# Patient Record
Sex: FEMALE | Race: OTHER | HISPANIC OR LATINO
[De-identification: names, ages, dates, MRNs, and addresses within clinical notes are randomized per-mention and may not be internally consistent; named-entity substitution may affect disease eponyms.]

---

## 2020-02-26 ENCOUNTER — APPOINTMENT (OUTPATIENT)
Dept: GASTROENTEROLOGY | Facility: CLINIC | Age: 55
End: 2020-02-26

## 2020-03-25 ENCOUNTER — APPOINTMENT (OUTPATIENT)
Dept: GASTROENTEROLOGY | Facility: CLINIC | Age: 55
End: 2020-03-25

## 2020-05-27 ENCOUNTER — EMERGENCY (EMERGENCY)
Facility: HOSPITAL | Age: 55
LOS: 1 days | Discharge: HOME | End: 2020-05-27
Attending: STUDENT IN AN ORGANIZED HEALTH CARE EDUCATION/TRAINING PROGRAM
Payer: MEDICARE

## 2020-05-27 VITALS
HEIGHT: 61 IN | WEIGHT: 156.97 LBS | SYSTOLIC BLOOD PRESSURE: 104 MMHG | RESPIRATION RATE: 18 BRPM | OXYGEN SATURATION: 98 % | DIASTOLIC BLOOD PRESSURE: 68 MMHG | TEMPERATURE: 99 F | HEART RATE: 80 BPM

## 2020-05-27 PROCEDURE — 73610 X-RAY EXAM OF ANKLE: CPT | Mod: 26,RT

## 2020-05-27 PROCEDURE — 99284 EMERGENCY DEPT VISIT MOD MDM: CPT

## 2020-05-27 PROCEDURE — 73590 X-RAY EXAM OF LOWER LEG: CPT | Mod: 26,RT

## 2020-05-27 RX ADMIN — Medication 100 MILLIGRAM(S): at 23:55

## 2020-05-27 NOTE — ED PROVIDER NOTE - OBJECTIVE STATEMENT
56 yo female PMhx IDDM, HTN, CAD s/p stents presents to the ED c/o R ankle pain. 3 days ago pt was walking downstairs, missed last step and rolled ankle, unsure if eversion vs inversion, fell down onto anterior lower leg. Minimal pain immediately following incident, was ambulatory afterwards. Ambulated all day yesterday on foot, today noted increased swelling and pain to ankle prompting ED visit. Took tylenol ~5 hours ago w/ improvement. Denies head trauma, LOC, numbness/tingling, weakness, knee pain, cp, sob.

## 2020-05-27 NOTE — ED PROVIDER NOTE - NSFOLLOWUPINSTRUCTIONS_ED_ALL_ED_FT
We evaluated you in the emergency room and it appears that you have a muscle strain and cellulitis. We recommend for the cellulitis you take cephalexin for further management of the redness on the lower leg.     We recommend that you rest, ice and take tylenol(650 mg up to three times a day)/motrin(600 mg up to three times a day) for your symptoms.       If you still have persistent pain after 5-7 days after the injury please be re-evaluated as there could be a more severe diagnosis than just a strain. If you develop numbness, weakness, change in color of your extremities (turn blue or white), worsening pain please seek immediate medical care for repeat evaluation.

## 2020-05-27 NOTE — ED PROVIDER NOTE - LOWER EXTREMITY EXAM, RIGHT
+swelling and ecchymosis noted to R ankle, most notably to lateral malleolar region but also +mild swelling to medial malleolus. +ttp over lateral and medial malleoli, lateral>medial, +ATFL ttp. No patellar to lateral/medial knee joint line ttp. No bony forefoot ttp. +proximal fib ttp. Negative Lachman's, negative Jc's. No increased joint laxity of ankle. No calf ttp. Full AROM knee and hip with 5/5 strength. ROM R ankle limited all directions 2/2 swelling and pain. Compartments soft/compressible. Sensation intact. +swelling and ecchymosis noted to R ankle, most notably to lateral malleolar region but also +mild swelling to medial malleolus. +ttp over lateral and medial malleoli, lateral>medial, +ATFL ttp. No patellar or lateral/medial knee joint line ttp. No knee joint effusion. No bony forefoot ttp. +proximal fib ttp. Negative Lachman's, negative Jc's. No increased joint laxity of ankle. No calf ttp. Full AROM knee and hip with 5/5 strength. ROM R ankle limited all directions 2/2 swelling and pain. Compartments soft/compressible. Sensation intact. +swelling and ecchymosis noted to R ankle, most notably to lateral malleolar region but also +mild swelling to medial malleolus. +ttp over lateral and medial malleoli, lateral>medial, +ATFL ttp. No patellar or lateral/medial knee joint line ttp. No knee joint effusion. No bony forefoot ttp. +proximal fib ttp, no proximal tibial ttp. Negative Lachman's, negative Jc's. No increased joint laxity of ankle. No calf ttp. Full AROM knee and hip with 5/5 strength. ROM R ankle limited all directions 2/2 swelling and pain. Compartments soft/compressible. Sensation intact.

## 2020-05-27 NOTE — ED PROVIDER NOTE - PROGRESS NOTE DETAILS
Shanae Hendrickson MD phone call w/ daughter marta, injury 3 days ago(MONDAY) had pain and then symptoms have been improving, yesterday (tuesday) pt was very active, today pt w/ decreased activity 2/2 pain, last tylenol was at 630 PM, no fevers at home, no vomiting, pt w/  normal WBC, feels comfortable taking the patient home w/ oral antibiotics will tx w/ cellulitis and ankle sprain Shanae Hendrickson MD pt able to range the ankle w/ no worsening pain. s/p ace wrap, ambulatory in the ed .

## 2020-05-27 NOTE — ED PROVIDER NOTE - PATIENT PORTAL LINK FT
You can access the FollowMyHealth Patient Portal offered by [facility] by registering at the following website: [email or web address]. By joining [employer]’s FollowMyHealth portal, you will also be able to view your health information using other applications (apps) compatible with our system.

## 2020-05-27 NOTE — ED ADULT NURSE NOTE - NSIMPLEMENTINTERV_GEN_ALL_ED
Implemented All Fall Risk Interventions:  [place] to call system. Call bell, personal items and telephone within reach. Instruct patient to call for assistance. Room bathroom lighting operational. Non-slip footwear when patient is off stretcher. Physically safe environment: no spills, clutter or unnecessary equipment. Stretcher in lowest position, wheels locked, appropriate side rails in place. Provide visual cue, wrist band, yellow gown, etc. Monitor gait and stability. Monitor for mental status changes and reorient to person, place, and time. Review medications for side effects contributing to fall risk. Reinforce activity limits and safety measures with patient and family.

## 2020-05-27 NOTE — ED ADULT TRIAGE NOTE - CHIEF COMPLAINT QUOTE
Patient c/o right ankle swelling and pain s/p mechanical fall on Monday. Patient fell two steps down from basement stairs hurting right leg. No LOC, No head injury. History of Diabetes and CAD and HTN. Also anxiety and Depression.

## 2020-05-27 NOTE — ED PROVIDER NOTE - NSFOLLOWUPCLINICS_GEN_ALL_ED_FT
[facility] [place]  [facility]  .  [place]   Phone: [phone]  Fax:   Follow Up Time: Routine

## 2020-05-27 NOTE — ED ADULT NURSE NOTE - OBJECTIVE STATEMENT
55 YOF A&OX3 with pmh of 3 stents in heart, depression & DM presents to ED for right ankle pain s/p fall. Pt states fell 2 days ago walking downstairs to basement and hurt ankle. Pt rates pain 4/10, swelling noted to right ankle. pt denies sob, chest pain, n/v/d, headaches, dizziness, blurry vision.

## 2020-05-27 NOTE — ED PROVIDER NOTE - ATTENDING CONTRIBUTION TO CARE
anthony [id number] anthony [id number]  R ankle pain w/ erythema and pain, s/p ankle injury 3 days prior  now w/ worsening pain,   pt reports increased pain and swelling to the joint  able to flex and extend the ankle but w pain,   plan for labs, iv antibiotics anthony [id number]  R ankle pain w/ erythema and pain, s/p ankle injury 3 days prior  now w/ worsening pain,   pt reports increased pain and swelling to the joint  able to flex and extend the R ankle but w pain,  mild erythema on the lower R lower extremity no swelling of the calf, no tenderness. anthony [id number]  used.   Pt reports R ankle pain s/p injury 3 days prior w/ rolling ankle inwards now has worsening pain pt w/ mild swelling to the joint, no overlying erythema; mild erythema on the posterior aspect of the lower extremity and medial aspect approx 3cm by 5 cm proximal to the ankle joint. no swelling of the calf, no tenderness.  concern for cellulitis pt able to range the ankle but limited 2/2 pain, pt is able to bear weight on the lower extremity reports that she was ambulatory yesterday w/ significant activity. xray negative for fx, will dc on antibiotics, outpt follow up daughter and pt report understanding will return if fevers, vomiting, worsening pain in the ankle or swelling. Pt d/c'd w/ ace wrap

## 2020-05-27 NOTE — ED ADULT NURSE NOTE - CHIEF COMPLAINT QUOTE
Patient c/o right ankle swelling and painful s/p mechanical fall on Monday., 2 steps down from baseman stairs, hurting her leg. No head injury, no LOC. History of Diabetes, depression, and 3 stents to the heart (last stent on April 2019)

## 2020-05-27 NOTE — ED PROVIDER NOTE - CARE PLAN
Principal Discharge DX:	Sprain of other ligament of right ankle, initial encounter  Secondary Diagnosis:	Cellulitis, unspecified cellulitis site

## 2020-05-28 VITALS
TEMPERATURE: 98 F | DIASTOLIC BLOOD PRESSURE: 64 MMHG | RESPIRATION RATE: 16 BRPM | SYSTOLIC BLOOD PRESSURE: 98 MMHG | OXYGEN SATURATION: 99 % | HEART RATE: 66 BPM

## 2020-05-28 LAB
ALBUMIN SERPL ELPH-MCNC: 4.2 G/DL — SIGNIFICANT CHANGE UP (ref 3.3–5)
ALP SERPL-CCNC: 80 U/L — SIGNIFICANT CHANGE UP (ref 40–120)
ALT FLD-CCNC: 29 U/L — SIGNIFICANT CHANGE UP (ref 10–45)
ANION GAP SERPL CALC-SCNC: 10 MMOL/L — SIGNIFICANT CHANGE UP (ref 5–17)
AST SERPL-CCNC: 18 U/L — SIGNIFICANT CHANGE UP (ref 10–40)
BASOPHILS # BLD AUTO: 0.03 K/UL — SIGNIFICANT CHANGE UP (ref 0–0.2)
BASOPHILS NFR BLD AUTO: 0.5 % — SIGNIFICANT CHANGE UP (ref 0–2)
BILIRUB SERPL-MCNC: 0.2 MG/DL — SIGNIFICANT CHANGE UP (ref 0.2–1.2)
BUN SERPL-MCNC: 16 MG/DL — SIGNIFICANT CHANGE UP (ref 7–23)
CALCIUM SERPL-MCNC: 9.5 MG/DL — SIGNIFICANT CHANGE UP (ref 8.4–10.5)
CHLORIDE SERPL-SCNC: 102 MMOL/L — SIGNIFICANT CHANGE UP (ref 96–108)
CO2 SERPL-SCNC: 24 MMOL/L — SIGNIFICANT CHANGE UP (ref 22–31)
CREAT SERPL-MCNC: 1.01 MG/DL — SIGNIFICANT CHANGE UP (ref 0.5–1.3)
CRP SERPL-MCNC: 1.25 UG/ML — SIGNIFICANT CHANGE UP (ref 0–40)
EOSINOPHIL # BLD AUTO: 0.1 K/UL — SIGNIFICANT CHANGE UP (ref 0–0.5)
EOSINOPHIL NFR BLD AUTO: 1.7 % — SIGNIFICANT CHANGE UP (ref 0–6)
ERYTHROCYTE [SEDIMENTATION RATE] IN BLOOD: 35 MM/HR — HIGH (ref 0–20)
GAS PNL BLDV: SIGNIFICANT CHANGE UP
GLUCOSE SERPL-MCNC: 336 MG/DL — HIGH (ref 70–99)
HCT VFR BLD CALC: 32.4 % — LOW (ref 34.5–45)
HGB BLD-MCNC: 10.5 G/DL — LOW (ref 11.5–15.5)
IMM GRANULOCYTES NFR BLD AUTO: 0.3 % — SIGNIFICANT CHANGE UP (ref 0–1.5)
LYMPHOCYTES # BLD AUTO: 2.95 K/UL — SIGNIFICANT CHANGE UP (ref 1–3.3)
LYMPHOCYTES # BLD AUTO: 50.7 % — HIGH (ref 13–44)
MCHC RBC-ENTMCNC: 29.3 PG — SIGNIFICANT CHANGE UP (ref 27–34)
MCHC RBC-ENTMCNC: 32.4 GM/DL — SIGNIFICANT CHANGE UP (ref 32–36)
MCV RBC AUTO: 90.5 FL — SIGNIFICANT CHANGE UP (ref 80–100)
MONOCYTES # BLD AUTO: 0.46 K/UL — SIGNIFICANT CHANGE UP (ref 0–0.9)
MONOCYTES NFR BLD AUTO: 7.9 % — SIGNIFICANT CHANGE UP (ref 2–14)
NEUTROPHILS # BLD AUTO: 2.26 K/UL — SIGNIFICANT CHANGE UP (ref 1.8–7.4)
NEUTROPHILS NFR BLD AUTO: 38.9 % — LOW (ref 43–77)
NRBC # BLD: 0 /100 WBCS — SIGNIFICANT CHANGE UP (ref 0–0)
PLATELET # BLD AUTO: 236 K/UL — SIGNIFICANT CHANGE UP (ref 150–400)
POTASSIUM SERPL-MCNC: 4.2 MMOL/L — SIGNIFICANT CHANGE UP (ref 3.5–5.3)
POTASSIUM SERPL-SCNC: 4.2 MMOL/L — SIGNIFICANT CHANGE UP (ref 3.5–5.3)
PROT SERPL-MCNC: 6.8 G/DL — SIGNIFICANT CHANGE UP (ref 6–8.3)
RBC # BLD: 3.58 M/UL — LOW (ref 3.8–5.2)
RBC # FLD: 12.7 % — SIGNIFICANT CHANGE UP (ref 10.3–14.5)
SODIUM SERPL-SCNC: 136 MMOL/L — SIGNIFICANT CHANGE UP (ref 135–145)
WBC # BLD: 5.82 K/UL — SIGNIFICANT CHANGE UP (ref 3.8–10.5)
WBC # FLD AUTO: 5.82 K/UL — SIGNIFICANT CHANGE UP (ref 3.8–10.5)

## 2020-05-28 PROCEDURE — 99284 EMERGENCY DEPT VISIT MOD MDM: CPT | Mod: 25

## 2020-05-28 PROCEDURE — 73610 X-RAY EXAM OF ANKLE: CPT

## 2020-05-28 PROCEDURE — 83605 ASSAY OF LACTIC ACID: CPT

## 2020-05-28 PROCEDURE — 82330 ASSAY OF CALCIUM: CPT

## 2020-05-28 PROCEDURE — 82947 ASSAY GLUCOSE BLOOD QUANT: CPT

## 2020-05-28 PROCEDURE — 85652 RBC SED RATE AUTOMATED: CPT

## 2020-05-28 PROCEDURE — 96374 THER/PROPH/DIAG INJ IV PUSH: CPT

## 2020-05-28 PROCEDURE — 85027 COMPLETE CBC AUTOMATED: CPT

## 2020-05-28 PROCEDURE — 86140 C-REACTIVE PROTEIN: CPT

## 2020-05-28 PROCEDURE — 73590 X-RAY EXAM OF LOWER LEG: CPT

## 2020-05-28 PROCEDURE — 84295 ASSAY OF SERUM SODIUM: CPT

## 2020-05-28 PROCEDURE — 82962 GLUCOSE BLOOD TEST: CPT

## 2020-05-28 PROCEDURE — 82803 BLOOD GASES ANY COMBINATION: CPT

## 2020-05-28 PROCEDURE — 80053 COMPREHEN METABOLIC PANEL: CPT

## 2020-05-28 PROCEDURE — 82435 ASSAY OF BLOOD CHLORIDE: CPT

## 2020-05-28 PROCEDURE — 85014 HEMATOCRIT: CPT

## 2020-05-28 PROCEDURE — 84132 ASSAY OF SERUM POTASSIUM: CPT

## 2020-05-28 RX ORDER — CEPHALEXIN 500 MG
1 CAPSULE ORAL
Qty: 28 | Refills: 0
Start: 2020-05-28 | End: 2020-06-03

## 2020-05-28 RX ORDER — IBUPROFEN 200 MG
1 TABLET ORAL
Qty: 21 | Refills: 0
Start: 2020-05-28 | End: 2020-06-03

## 2020-05-28 RX ORDER — ACETAMINOPHEN 500 MG
650 TABLET ORAL ONCE
Refills: 0 | Status: COMPLETED | OUTPATIENT
Start: 2020-05-28 | End: 2020-05-28

## 2020-05-28 RX ADMIN — Medication 650 MILLIGRAM(S): at 00:38

## 2020-05-29 NOTE — ED POST DISCHARGE NOTE - REASON FOR FOLLOW-UP
Radiology Follow-up/Other Xray ankle R: Irregularity of the medial talar dome with small overlying ossific fragment, consistent with osteochondral lesion. Possible small joint bodies. Small plantar calcaneal enthesophyte.

## 2020-05-29 NOTE — ED POST DISCHARGE NOTE - DETAILS
5/29: Unable to reach patient, LVM to call back admin line. -Ifeoma Castellanos PA-C Informed patient of findings on xray. patient is able to ambulate without difficulty with intermittent ace wrap. advised her to continue to wear as needed and follow-up with orthopedics clinic with information provided to her. patient verbalized understanding and agreement with plan. -Selma West PA-C

## 2020-05-29 NOTE — ED POST DISCHARGE NOTE - OTHER COMMUNICATION
6/5:  CRP 1.25. ESR elevated as well noted above. No need to contact pt as would not  - Estela Swain PA-C

## 2020-06-10 ENCOUNTER — APPOINTMENT (OUTPATIENT)
Dept: GASTROENTEROLOGY | Facility: CLINIC | Age: 55
End: 2020-06-10

## 2022-07-26 ENCOUNTER — NON-APPOINTMENT (OUTPATIENT)
Age: 57
End: 2022-07-26

## 2022-09-15 NOTE — ED PROVIDER NOTE - SKIN [+], MLM
Patient presents with following prescriptions from dental work on 9/14/22:    Amoxicillin 500 mg, take one by mouth every 8 hours   #21    Oxycodone/acetaminophen 5-325mg, take one tablet every 4-6 hours as needed, do not exceed more than 4 tablets in a 24 period. #10.    Patient aware that medications need to be on him at all times and are not be left in the group room. Patient encouraged to only bring medication that he may need to take during group hours and to leave additional medication at home.    Tasha Ross RN, BSN     BRUISING/R ankle

## 2023-01-24 ENCOUNTER — NON-APPOINTMENT (OUTPATIENT)
Age: 58
End: 2023-01-24

## 2023-01-25 ENCOUNTER — APPOINTMENT (OUTPATIENT)
Dept: PULMONOLOGY | Facility: CLINIC | Age: 58
End: 2023-01-25
Payer: MEDICARE

## 2023-01-25 VITALS
HEIGHT: 61 IN | RESPIRATION RATE: 16 BRPM | BODY MASS INDEX: 27.94 KG/M2 | WEIGHT: 148 LBS | DIASTOLIC BLOOD PRESSURE: 67 MMHG | HEART RATE: 77 BPM | SYSTOLIC BLOOD PRESSURE: 103 MMHG | OXYGEN SATURATION: 99 %

## 2023-01-25 DIAGNOSIS — Z98.890 OTHER SPECIFIED POSTPROCEDURAL STATES: ICD-10-CM

## 2023-01-25 DIAGNOSIS — R06.09 OTHER FORMS OF DYSPNEA: ICD-10-CM

## 2023-01-25 DIAGNOSIS — Z87.898 PERSONAL HISTORY OF OTHER SPECIFIED CONDITIONS: ICD-10-CM

## 2023-01-25 DIAGNOSIS — R06.00 DYSPNEA, UNSPECIFIED: ICD-10-CM

## 2023-01-25 DIAGNOSIS — Z87.09 PERSONAL HISTORY OF OTHER DISEASES OF THE RESPIRATORY SYSTEM: ICD-10-CM

## 2023-01-25 PROCEDURE — 71046 X-RAY EXAM CHEST 2 VIEWS: CPT

## 2023-01-25 PROCEDURE — 99205 OFFICE O/P NEW HI 60 MIN: CPT | Mod: 25

## 2023-01-25 PROCEDURE — 95012 NITRIC OXIDE EXP GAS DETER: CPT

## 2023-01-25 PROCEDURE — 94010 BREATHING CAPACITY TEST: CPT

## 2023-01-25 RX ORDER — MONTELUKAST SODIUM 10 MG/1
TABLET, FILM COATED ORAL
Refills: 0 | Status: ACTIVE | COMMUNITY

## 2023-01-25 RX ORDER — IPRATROPIUM BROMIDE AND ALBUTEROL SULFATE .5; 3 MG/3ML; MG/3ML
SOLUTION RESPIRATORY (INHALATION)
Refills: 0 | Status: ACTIVE | COMMUNITY

## 2023-01-25 RX ORDER — ASPIRIN 81 MG
81 TABLET, DELAYED RELEASE (ENTERIC COATED) ORAL
Refills: 0 | Status: ACTIVE | COMMUNITY

## 2023-01-25 RX ORDER — ARFORMOTEROL TARTRATE 15 UG/2ML
SOLUTION RESPIRATORY (INHALATION)
Refills: 0 | Status: ACTIVE | COMMUNITY

## 2023-01-25 RX ORDER — BUDESONIDE 0.5 MG/2ML
0.5 INHALANT ORAL
Refills: 0 | Status: ACTIVE | COMMUNITY

## 2023-01-25 RX ORDER — BUPROPION HYDROCHLORIDE 75 MG/1
TABLET, FILM COATED ORAL
Refills: 0 | Status: ACTIVE | COMMUNITY

## 2023-01-25 RX ORDER — METOPROLOL TARTRATE 75 MG/1
TABLET, FILM COATED ORAL
Refills: 0 | Status: ACTIVE | COMMUNITY

## 2023-01-25 RX ORDER — OXYCODONE AND ACETAMINOPHEN TABLETS 10; 300 MG/1; MG/1
TABLET ORAL
Refills: 0 | Status: ACTIVE | COMMUNITY

## 2023-01-25 RX ORDER — INSULIN GLARGINE 100 [IU]/ML
INJECTION, SOLUTION SUBCUTANEOUS
Refills: 0 | Status: ACTIVE | COMMUNITY

## 2023-01-25 RX ORDER — SERTRALINE HYDROCHLORIDE 150 MG/1
CAPSULE ORAL
Refills: 0 | Status: ACTIVE | COMMUNITY

## 2023-01-25 RX ORDER — FAMOTIDINE 40 MG/1
TABLET, FILM COATED ORAL
Refills: 0 | Status: ACTIVE | COMMUNITY

## 2023-01-25 RX ORDER — ATORVASTATIN CALCIUM 80 MG/1
TABLET, FILM COATED ORAL
Refills: 0 | Status: ACTIVE | COMMUNITY

## 2023-01-26 ENCOUNTER — NON-APPOINTMENT (OUTPATIENT)
Age: 58
End: 2023-01-26

## 2023-01-26 LAB
ANION GAP SERPL CALC-SCNC: 17 MMOL/L
BASOPHILS # BLD AUTO: 0.05 K/UL
BASOPHILS NFR BLD AUTO: 0.8 %
BUN SERPL-MCNC: 13 MG/DL
CALCIUM SERPL-MCNC: 10.3 MG/DL
CHLORIDE SERPL-SCNC: 100 MMOL/L
CO2 SERPL-SCNC: 23 MMOL/L
CREAT SERPL-MCNC: 0.62 MG/DL
DEPRECATED D DIMER PPP IA-ACNC: 944 NG/ML DDU
EGFR: 104 ML/MIN/1.73M2
EOSINOPHIL # BLD AUTO: 0.08 K/UL
EOSINOPHIL NFR BLD AUTO: 1.3 %
GLUCOSE SERPL-MCNC: 133 MG/DL
HCT VFR BLD CALC: 33.9 %
HGB BLD-MCNC: 10.8 G/DL
IMM GRANULOCYTES NFR BLD AUTO: 0.3 %
LYMPHOCYTES # BLD AUTO: 2.89 K/UL
LYMPHOCYTES NFR BLD AUTO: 46.2 %
MAN DIFF?: NORMAL
MCHC RBC-ENTMCNC: 28.8 PG
MCHC RBC-ENTMCNC: 31.9 GM/DL
MCV RBC AUTO: 90.4 FL
MONOCYTES # BLD AUTO: 0.5 K/UL
MONOCYTES NFR BLD AUTO: 8 %
NEUTROPHILS # BLD AUTO: 2.71 K/UL
NEUTROPHILS NFR BLD AUTO: 43.4 %
NT-PROBNP SERPL-MCNC: 553 PG/ML
PLATELET # BLD AUTO: 493 K/UL
POTASSIUM SERPL-SCNC: 5.4 MMOL/L
RBC # BLD: 3.75 M/UL
RBC # FLD: 13.8 %
SODIUM SERPL-SCNC: 141 MMOL/L
WBC # FLD AUTO: 6.25 K/UL

## 2023-01-29 PROBLEM — R06.00 DYSPNEA ON EFFORT: Status: ACTIVE | Noted: 2023-01-25

## 2023-01-29 NOTE — PHYSICAL EXAM
[No Acute Distress] : no acute distress [Well Nourished] : well nourished [Well Developed] : well developed [No Resp Distress] : no resp distress [No Acc Muscle Use] : no acc muscle use [Clear to Auscultation Bilaterally] : clear to auscultation bilaterally [Oriented x3] : oriented x3 [TextBox_54] : healing surgical cabg scar, no drainage, no pus, no erythema

## 2023-01-29 NOTE — HISTORY OF PRESENT ILLNESS
[< 20 pack-years] : < 20 pack-years [Never] : never [TextBox_4] : NATASHA AGUILERA is a 58 year old female who presents with family for pulm eval\par \par PMH: cad s/p cardiac cath, s/p cabg in 2023,  asthma history, on albuterol TID, ex smoker- 20 years X 0.25\par had CABG at [facility] 1/6/2023\par no post op compliations\par \par pending first surgical follow up this afternoon\par \par has a cough- \par hard to breath in - especially with deep  inspiration\par \par asthma history- albuterol PRN [TextBox_11] : 0.25 [TextBox_13] : 20 [YearQuit] : 2023

## 2023-01-29 NOTE — PROCEDURE
[FreeTextEntry1] : hard to do ángel\par niox 17\par \par PA and lateral chest xray performed using standard projections. Bones and soft tissues structures are unremarkable.Cardiac silhouette show surgical sutures . Lung fields are clear. No infiltrate or masses noted. Mediastinal contours are normal.\par IMPRESSION: s.p CABG\par \par

## 2023-10-02 ENCOUNTER — NON-APPOINTMENT (OUTPATIENT)
Age: 58
End: 2023-10-02

## 2023-10-03 ENCOUNTER — EMERGENCY (EMERGENCY)
Facility: HOSPITAL | Age: 58
LOS: 1 days | Discharge: HOME | End: 2023-10-03
Attending: EMERGENCY MEDICINE
Payer: COMMERCIAL

## 2023-10-03 VITALS
RESPIRATION RATE: 19 BRPM | HEART RATE: 57 BPM | DIASTOLIC BLOOD PRESSURE: 78 MMHG | TEMPERATURE: 98 F | OXYGEN SATURATION: 98 % | WEIGHT: 145.06 LBS | HEIGHT: 60 IN | SYSTOLIC BLOOD PRESSURE: 116 MMHG

## 2023-10-03 VITALS
SYSTOLIC BLOOD PRESSURE: 121 MMHG | HEART RATE: 59 BPM | RESPIRATION RATE: 18 BRPM | OXYGEN SATURATION: 98 % | DIASTOLIC BLOOD PRESSURE: 83 MMHG | TEMPERATURE: 98 F

## 2023-10-03 LAB
ALBUMIN SERPL ELPH-MCNC: 4.6 G/DL — SIGNIFICANT CHANGE UP (ref 3.3–5)
ALP SERPL-CCNC: 75 U/L — SIGNIFICANT CHANGE UP (ref 40–120)
ALT FLD-CCNC: 37 U/L — SIGNIFICANT CHANGE UP (ref 10–45)
ANION GAP SERPL CALC-SCNC: 11 MMOL/L — SIGNIFICANT CHANGE UP (ref 5–17)
APPEARANCE UR: CLEAR — SIGNIFICANT CHANGE UP
AST SERPL-CCNC: 25 U/L — SIGNIFICANT CHANGE UP (ref 10–40)
BACTERIA # UR AUTO: NEGATIVE — SIGNIFICANT CHANGE UP
BASE EXCESS BLDV CALC-SCNC: 2.9 MMOL/L — SIGNIFICANT CHANGE UP (ref -2–3)
BASOPHILS # BLD AUTO: 0.05 K/UL — SIGNIFICANT CHANGE UP (ref 0–0.2)
BASOPHILS NFR BLD AUTO: 0.5 % — SIGNIFICANT CHANGE UP (ref 0–2)
BILIRUB SERPL-MCNC: 0.2 MG/DL — SIGNIFICANT CHANGE UP (ref 0.2–1.2)
BILIRUB UR-MCNC: NEGATIVE — SIGNIFICANT CHANGE UP
BUN SERPL-MCNC: 25 MG/DL — HIGH (ref 7–23)
CA-I SERPL-SCNC: 1.31 MMOL/L — SIGNIFICANT CHANGE UP (ref 1.15–1.33)
CALCIUM SERPL-MCNC: 10 MG/DL — SIGNIFICANT CHANGE UP (ref 8.4–10.5)
CHLORIDE BLDV-SCNC: 102 MMOL/L — SIGNIFICANT CHANGE UP (ref 96–108)
CHLORIDE SERPL-SCNC: 100 MMOL/L — SIGNIFICANT CHANGE UP (ref 96–108)
CO2 BLDV-SCNC: 32 MMOL/L — HIGH (ref 22–26)
CO2 SERPL-SCNC: 27 MMOL/L — SIGNIFICANT CHANGE UP (ref 22–31)
COLOR SPEC: COLORLESS — SIGNIFICANT CHANGE UP
CREAT SERPL-MCNC: 0.72 MG/DL — SIGNIFICANT CHANGE UP (ref 0.5–1.3)
CRP SERPL-MCNC: 4 MG/L — SIGNIFICANT CHANGE UP (ref 0–4)
DIFF PNL FLD: NEGATIVE — SIGNIFICANT CHANGE UP
EGFR: 98 ML/MIN/1.73M2 — SIGNIFICANT CHANGE UP
EOSINOPHIL # BLD AUTO: 0.35 K/UL — SIGNIFICANT CHANGE UP (ref 0–0.5)
EOSINOPHIL NFR BLD AUTO: 3.6 % — SIGNIFICANT CHANGE UP (ref 0–6)
EPI CELLS # UR: 2 /HPF — SIGNIFICANT CHANGE UP
ERYTHROCYTE [SEDIMENTATION RATE] IN BLOOD: 25 MM/HR — HIGH (ref 0–20)
GAS PNL BLDV: 134 MMOL/L — LOW (ref 136–145)
GAS PNL BLDV: SIGNIFICANT CHANGE UP
GAS PNL BLDV: SIGNIFICANT CHANGE UP
GLUCOSE BLDV-MCNC: 207 MG/DL — HIGH (ref 70–99)
GLUCOSE SERPL-MCNC: 206 MG/DL — HIGH (ref 70–99)
GLUCOSE UR QL: ABNORMAL
HCO3 BLDV-SCNC: 30 MMOL/L — HIGH (ref 22–29)
HCT VFR BLD CALC: 35.6 % — SIGNIFICANT CHANGE UP (ref 34.5–45)
HCT VFR BLDA CALC: 38 % — SIGNIFICANT CHANGE UP (ref 34.5–46.5)
HGB BLD CALC-MCNC: 12.6 G/DL — SIGNIFICANT CHANGE UP (ref 11.7–16.1)
HGB BLD-MCNC: 12.1 G/DL — SIGNIFICANT CHANGE UP (ref 11.5–15.5)
HYALINE CASTS # UR AUTO: 0 /LPF — SIGNIFICANT CHANGE UP (ref 0–2)
IMM GRANULOCYTES NFR BLD AUTO: 0.5 % — SIGNIFICANT CHANGE UP (ref 0–0.9)
KETONES UR-MCNC: NEGATIVE — SIGNIFICANT CHANGE UP
LACTATE BLDV-MCNC: 1 MMOL/L — SIGNIFICANT CHANGE UP (ref 0.5–2)
LEUKOCYTE ESTERASE UR-ACNC: ABNORMAL
LYMPHOCYTES # BLD AUTO: 2.68 K/UL — SIGNIFICANT CHANGE UP (ref 1–3.3)
LYMPHOCYTES # BLD AUTO: 27.9 % — SIGNIFICANT CHANGE UP (ref 13–44)
MCHC RBC-ENTMCNC: 30.5 PG — SIGNIFICANT CHANGE UP (ref 27–34)
MCHC RBC-ENTMCNC: 34 GM/DL — SIGNIFICANT CHANGE UP (ref 32–36)
MCV RBC AUTO: 89.7 FL — SIGNIFICANT CHANGE UP (ref 80–100)
MONOCYTES # BLD AUTO: 0.63 K/UL — SIGNIFICANT CHANGE UP (ref 0–0.9)
MONOCYTES NFR BLD AUTO: 6.6 % — SIGNIFICANT CHANGE UP (ref 2–14)
NEUTROPHILS # BLD AUTO: 5.85 K/UL — SIGNIFICANT CHANGE UP (ref 1.8–7.4)
NEUTROPHILS NFR BLD AUTO: 60.9 % — SIGNIFICANT CHANGE UP (ref 43–77)
NITRITE UR-MCNC: NEGATIVE — SIGNIFICANT CHANGE UP
NRBC # BLD: 0 /100 WBCS — SIGNIFICANT CHANGE UP (ref 0–0)
PCO2 BLDV: 59 MMHG — HIGH (ref 39–42)
PH BLDV: 7.32 — SIGNIFICANT CHANGE UP (ref 7.32–7.43)
PH UR: 6 — SIGNIFICANT CHANGE UP (ref 5–8)
PLATELET # BLD AUTO: 232 K/UL — SIGNIFICANT CHANGE UP (ref 150–400)
PO2 BLDV: 23 MMHG — LOW (ref 25–45)
POTASSIUM BLDV-SCNC: 4.1 MMOL/L — SIGNIFICANT CHANGE UP (ref 3.5–5.1)
POTASSIUM SERPL-MCNC: 4.1 MMOL/L — SIGNIFICANT CHANGE UP (ref 3.5–5.3)
POTASSIUM SERPL-SCNC: 4.1 MMOL/L — SIGNIFICANT CHANGE UP (ref 3.5–5.3)
PROT SERPL-MCNC: 7.4 G/DL — SIGNIFICANT CHANGE UP (ref 6–8.3)
PROT UR-MCNC: NEGATIVE — SIGNIFICANT CHANGE UP
RBC # BLD: 3.97 M/UL — SIGNIFICANT CHANGE UP (ref 3.8–5.2)
RBC # FLD: 12.4 % — SIGNIFICANT CHANGE UP (ref 10.3–14.5)
RBC CASTS # UR COMP ASSIST: 0 /HPF — SIGNIFICANT CHANGE UP (ref 0–4)
SAO2 % BLDV: 27.4 % — LOW (ref 67–88)
SODIUM SERPL-SCNC: 138 MMOL/L — SIGNIFICANT CHANGE UP (ref 135–145)
SP GR SPEC: 1.01 — SIGNIFICANT CHANGE UP (ref 1.01–1.02)
UROBILINOGEN FLD QL: NEGATIVE — SIGNIFICANT CHANGE UP
WBC # BLD: 9.61 K/UL — SIGNIFICANT CHANGE UP (ref 3.8–10.5)
WBC # FLD AUTO: 9.61 K/UL — SIGNIFICANT CHANGE UP (ref 3.8–10.5)
WBC UR QL: 4 /HPF — SIGNIFICANT CHANGE UP (ref 0–5)

## 2023-10-03 PROCEDURE — 85018 HEMOGLOBIN: CPT

## 2023-10-03 PROCEDURE — 81001 URINALYSIS AUTO W/SCOPE: CPT

## 2023-10-03 PROCEDURE — 86140 C-REACTIVE PROTEIN: CPT

## 2023-10-03 PROCEDURE — 73630 X-RAY EXAM OF FOOT: CPT

## 2023-10-03 PROCEDURE — 36415 COLL VENOUS BLD VENIPUNCTURE: CPT

## 2023-10-03 PROCEDURE — 85014 HEMATOCRIT: CPT

## 2023-10-03 PROCEDURE — 84484 ASSAY OF TROPONIN QUANT: CPT

## 2023-10-03 PROCEDURE — 96375 TX/PRO/DX INJ NEW DRUG ADDON: CPT

## 2023-10-03 PROCEDURE — 84132 ASSAY OF SERUM POTASSIUM: CPT

## 2023-10-03 PROCEDURE — 99285 EMERGENCY DEPT VISIT HI MDM: CPT

## 2023-10-03 PROCEDURE — 96366 THER/PROPH/DIAG IV INF ADDON: CPT

## 2023-10-03 PROCEDURE — 93971 EXTREMITY STUDY: CPT

## 2023-10-03 PROCEDURE — 96367 TX/PROPH/DG ADDL SEQ IV INF: CPT

## 2023-10-03 PROCEDURE — 82803 BLOOD GASES ANY COMBINATION: CPT

## 2023-10-03 PROCEDURE — 82435 ASSAY OF BLOOD CHLORIDE: CPT

## 2023-10-03 PROCEDURE — 85652 RBC SED RATE AUTOMATED: CPT

## 2023-10-03 PROCEDURE — 85025 COMPLETE CBC W/AUTO DIFF WBC: CPT

## 2023-10-03 PROCEDURE — 83605 ASSAY OF LACTIC ACID: CPT

## 2023-10-03 PROCEDURE — 99285 EMERGENCY DEPT VISIT HI MDM: CPT | Mod: 25

## 2023-10-03 PROCEDURE — 82947 ASSAY GLUCOSE BLOOD QUANT: CPT

## 2023-10-03 PROCEDURE — 87040 BLOOD CULTURE FOR BACTERIA: CPT

## 2023-10-03 PROCEDURE — 82330 ASSAY OF CALCIUM: CPT

## 2023-10-03 PROCEDURE — 84295 ASSAY OF SERUM SODIUM: CPT

## 2023-10-03 PROCEDURE — 93971 EXTREMITY STUDY: CPT | Mod: 26,LT

## 2023-10-03 PROCEDURE — 80053 COMPREHEN METABOLIC PANEL: CPT

## 2023-10-03 PROCEDURE — 73630 X-RAY EXAM OF FOOT: CPT | Mod: 26,LT

## 2023-10-03 PROCEDURE — 96365 THER/PROPH/DIAG IV INF INIT: CPT

## 2023-10-03 RX ORDER — ACETAMINOPHEN 500 MG
1000 TABLET ORAL ONCE
Refills: 0 | Status: COMPLETED | OUTPATIENT
Start: 2023-10-03 | End: 2023-10-03

## 2023-10-03 RX ORDER — LIDOCAINE 4 G/100G
1 CREAM TOPICAL ONCE
Refills: 0 | Status: COMPLETED | OUTPATIENT
Start: 2023-10-03 | End: 2023-10-03

## 2023-10-03 RX ORDER — MORPHINE SULFATE 50 MG/1
4 CAPSULE, EXTENDED RELEASE ORAL ONCE
Refills: 0 | Status: DISCONTINUED | OUTPATIENT
Start: 2023-10-03 | End: 2023-10-03

## 2023-10-03 RX ORDER — SODIUM CHLORIDE 9 MG/ML
1000 INJECTION INTRAMUSCULAR; INTRAVENOUS; SUBCUTANEOUS ONCE
Refills: 0 | Status: COMPLETED | OUTPATIENT
Start: 2023-10-03 | End: 2023-10-03

## 2023-10-03 RX ORDER — PIPERACILLIN AND TAZOBACTAM 4; .5 G/20ML; G/20ML
3.38 INJECTION, POWDER, LYOPHILIZED, FOR SOLUTION INTRAVENOUS ONCE
Refills: 0 | Status: COMPLETED | OUTPATIENT
Start: 2023-10-03 | End: 2023-10-03

## 2023-10-03 RX ORDER — CYCLOBENZAPRINE HYDROCHLORIDE 10 MG/1
1 TABLET, FILM COATED ORAL
Qty: 6 | Refills: 0
Start: 2023-10-03 | End: 2023-10-05

## 2023-10-03 RX ORDER — KETOROLAC TROMETHAMINE 30 MG/ML
15 SYRINGE (ML) INJECTION ONCE
Refills: 0 | Status: DISCONTINUED | OUTPATIENT
Start: 2023-10-03 | End: 2023-10-03

## 2023-10-03 RX ORDER — CYCLOBENZAPRINE HYDROCHLORIDE 10 MG/1
5 TABLET, FILM COATED ORAL ONCE
Refills: 0 | Status: COMPLETED | OUTPATIENT
Start: 2023-10-03 | End: 2023-10-03

## 2023-10-03 RX ORDER — IPRATROPIUM/ALBUTEROL SULFATE 18-103MCG
3 AEROSOL WITH ADAPTER (GRAM) INHALATION ONCE
Refills: 0 | Status: DISCONTINUED | OUTPATIENT
Start: 2023-10-03 | End: 2023-10-03

## 2023-10-03 RX ORDER — VANCOMYCIN HCL 1 G
1000 VIAL (EA) INTRAVENOUS ONCE
Refills: 0 | Status: COMPLETED | OUTPATIENT
Start: 2023-10-03 | End: 2023-10-03

## 2023-10-03 RX ADMIN — LIDOCAINE 1 PATCH: 4 CREAM TOPICAL at 18:37

## 2023-10-03 RX ADMIN — MORPHINE SULFATE 4 MILLIGRAM(S): 50 CAPSULE, EXTENDED RELEASE ORAL at 09:39

## 2023-10-03 RX ADMIN — Medication 400 MILLIGRAM(S): at 14:36

## 2023-10-03 RX ADMIN — PIPERACILLIN AND TAZOBACTAM 3.38 GRAM(S): 4; .5 INJECTION, POWDER, LYOPHILIZED, FOR SOLUTION INTRAVENOUS at 10:20

## 2023-10-03 RX ADMIN — MORPHINE SULFATE 4 MILLIGRAM(S): 50 CAPSULE, EXTENDED RELEASE ORAL at 09:55

## 2023-10-03 RX ADMIN — Medication 250 MILLIGRAM(S): at 10:30

## 2023-10-03 RX ADMIN — Medication 1000 MILLIGRAM(S): at 18:39

## 2023-10-03 RX ADMIN — Medication 1000 MILLIGRAM(S): at 18:06

## 2023-10-03 RX ADMIN — SODIUM CHLORIDE 1000 MILLILITER(S): 9 INJECTION INTRAMUSCULAR; INTRAVENOUS; SUBCUTANEOUS at 11:15

## 2023-10-03 RX ADMIN — Medication 1000 MILLIGRAM(S): at 11:45

## 2023-10-03 RX ADMIN — SODIUM CHLORIDE 1000 MILLILITER(S): 9 INJECTION INTRAMUSCULAR; INTRAVENOUS; SUBCUTANEOUS at 09:20

## 2023-10-03 RX ADMIN — CYCLOBENZAPRINE HYDROCHLORIDE 5 MILLIGRAM(S): 10 TABLET, FILM COATED ORAL at 18:47

## 2023-10-03 RX ADMIN — PIPERACILLIN AND TAZOBACTAM 200 GRAM(S): 4; .5 INJECTION, POWDER, LYOPHILIZED, FOR SOLUTION INTRAVENOUS at 09:39

## 2023-10-03 NOTE — ED ADULT NURSE NOTE - NSFALLHARMRISKINTERV_ED_ALL_ED
Assistance OOB with selected safe patient handling equipment if applicable/Assistance with ambulation/Communicate risk of Fall with Harm to all staff, patient, and family/Monitor gait and stability/Provide visual cue: red socks, yellow wristband, yellow gown, etc/Reinforce activity limits and safety measures with patient and family/Bed in lowest position, wheels locked, appropriate side rails in place/Call bell, personal items and telephone in reach/Instruct patient to call for assistance before getting out of bed/chair/stretcher/Non-slip footwear applied when patient is off stretcher/[place] to call system/Physically safe environment - no spills, clutter or unnecessary equipment/Purposeful Proactive Rounding/Room/bathroom lighting operational, light cord in reach

## 2023-10-03 NOTE — ED ADULT TRIAGE NOTE - NS ED TRIAGE AVPU SCALE
Alert-The patient is alert, awake and responds to voice. The patient is oriented to time, place, and person. The triage nurse is able to obtain subjective information.
Implemented All Fall Risk Interventions:  [place] to call system. Call bell, personal items and telephone within reach. Instruct patient to call for assistance. Room bathroom lighting operational. Non-slip footwear when patient is off stretcher. Physically safe environment: no spills, clutter or unnecessary equipment. Stretcher in lowest position, wheels locked, appropriate side rails in place. Provide visual cue, wrist band, yellow gown, etc. Monitor gait and stability. Monitor for mental status changes and reorient to person, place, and time. Review medications for side effects contributing to fall risk. Reinforce activity limits and safety measures with patient and family.

## 2023-10-03 NOTE — ED PROVIDER NOTE - NSFOLLOWUPINSTRUCTIONS_ED_ALL_ED_FT
YOU WERE EVALUATED in the ED to rule out osteomyelitis and a DVT. Our lab work did not reveal any signs of infection, and our ultrasound of your legs did not reveal a blood clot.    PLEASE CALL [phone] to set up a podiatry appointment with Dr Choi at the wound care center within one week.    PLEASE make your appointment with your orthopedist. Our team will call you to make an appointment with orthopedic surgery as an alternative.     Back Pain    Back pain is very common in adults. The cause of back pain is rarely dangerous and the pain often gets better over time. The cause of your back pain may not be known and may include strain of muscles or ligaments, degeneration of the spinal disks, or arthritis. Occasionally the pain may radiate down your leg(s). Over-the-counter medicines to reduce pain and inflammation are often the most helpful. Stretching and remaining active frequently helps the healing process.     1.  Please take motrin OR ibu profen OR advil (600 mg by mouth up to every 6 hours with food and water).  The lidocaine patch you may get over the counter is called 'Salon Paas' If you wish to use this, please use as indicated on box.   2.  You may also use a heating pad for additional comfort. Do not apply a heating pad over a lidocaine patch.  If you are wearing a patch you may remove it or use the heating pad at an alternate location.  3.  Please return to the ER immediately if you develop leg weakness, loss of bowel or bladder control (meaning that you are urinating or defecating on yourself when you did not mean to or are unable to urinate or defecate when you feel you must.  Also return to the ER if you develop numbness of your groin or genitals.  Even if the pain is well controlled or you are able to tolerate the pain, development of any of these symptoms may represent a medical emergency requiring immediate intervention.  Do not delay presentation should these occur.    SEEK IMMEDIATE MEDICAL CARE IF YOU HAVE ANY OF THE FOLLOWING SYMPTOMS: bowel or bladder control problems, unusual weakness or numbness in your arms or legs, nausea or vomiting, abdominal pain, fever, dizziness/lightheadedness.    4.  Please follow-up with a spinal specialist as indicated in your paperwork for ongoing care. spine center: [phone]    you can consider following with the Osteopathic manipulative medicine clinic at [facility]  ([facility], [facility], [facility], [facility], and [facility])   [place]   [address]   [place] [address]   [phone]    [facility] Orthopa[facility] at [facility]. Please call [phone] for an appointment.

## 2023-10-03 NOTE — ED PROVIDER NOTE - NSFOLLOWUPCLINICS_GEN_ALL_ED_FT
[facility] [place]  Neurosurgery/Spine  [address], [address]  [place] [address]  Phone: [phone]  Fax:

## 2023-10-03 NOTE — ED PROVIDER NOTE - PHYSICAL EXAMINATION
HEAD:  Atraumatic, normocephalic  EYES: conjunctiva and sclera clear  HEART: Regular rate and rhythm, no murmurs, rubs, or gallops  LUNGS: Unlabored respirations.  Clear to auscultation bilaterally, no crackles, wheezing, or rhonchi  ABDOMEN: Soft, nontender, nondistended,  EXTREMITIES: 2 cm circular wound on medial L ankle  NERVOUS SYSTEM:  A&Ox3, decreased proprioception over L toes compared to right. Decreased patellar reflex on the L compared to R

## 2023-10-03 NOTE — ED PROVIDER NOTE - CLINICAL SUMMARY MEDICAL DECISION MAKING FREE TEXT BOX
58yF PMHx IDDM, HLD, HTN, asthma, arthritis, CAD sent by Dr Koch after left ankle injury. Physical exam notable for decreased reflexes and proprioception compared to Right. Obtaining osteomyelitis lab and imaging w/u. LE doppler to r/o DVT. 58yF PMHx IDDM, HLD, HTN, asthma, arthritis, CAD sent by Dr Koch after left ankle injury.  and old wound which is getting infected .no fever ,but pt complains of severe constant pain   Physical exam notable for decreased reflexes and proprioception compared to Right.  concern for bone infection ,will obtain blood work ,CRP ,ESR ,xray ,[podiatry eval and iv antibiotics ZR

## 2023-10-03 NOTE — ED PROVIDER NOTE - PROGRESS NOTE DETAILS
podiatry called case discussed received sign out on patient now pt reports that she has chest pain, pt to get EKG and troponin, she states pain is in the middle of the chest, pt was seen by podiatry, at this time, pt was being wheeled to MRI,  phone call to mri, Xray spoke w/ pt [id number] reports cp, has been going on for 2 days pt states she didn't go to see a physician today, pt w/ no fevers, no chills, pt w/ no lightheadedness, no dizzines, no dyspnea, reports whole body pain. Yvon Salazar PGY-1: Spoke with patient's daughter, discussed strict return precautions for patient, due to negative troponin and long-standing chest pain daughter in agreement to go home

## 2023-10-03 NOTE — ED ADULT NURSE REASSESSMENT NOTE - NS ED NURSE REASSESS COMMENT FT1
Report received from BRIJESH Loera. Pt a&ox4, able to follow commands. Breathing spontaneous & nonlabored. Abdomen soft & nondistended. IV site patent, no signs of phlebitis, flushing without difficulty. Call bell within reach, bed in lowest position.

## 2023-10-03 NOTE — ED PROVIDER NOTE - PATIENT PORTAL LINK FT
You can access the FollowMyHealth Patient Portal offered by [facility] by registering at the following website: [email or web address]. By joining [employer]’s FollowMyHealth portal, you will also be able to view your health information using other applications (apps) compatible with our system.

## 2023-10-03 NOTE — ED PROVIDER NOTE - OBJECTIVE STATEMENT
58yF PMHx IDDM, HLD, HTN, asthma, arthritis, CAD sent by Dr Koch after left ankle injury. Patient reports that on August 17 she cut her medial L ankle with an oxidized tin can and developed severe pain in her L foot, ankle, and calf. Patient unsure when last tetanus shot was. Pt sent to r/o osteomyelitis and obtain DVT studies. To be admitted under Dr Gould

## 2023-10-03 NOTE — ED ADULT NURSE NOTE - OBJECTIVE STATEMENT
0910 58 yr old HF brought to ER by daughter for further eval and tx. s/p stepped on a metal object 6 wks ago while in [place]. Followed up with PMD for worsening pain and diff walking yesterday and advised to come to ER to r/o osteomyelitis vs DVT. Looks uncomfortable. Denies fever or chills, A&Ox4. hx through daughter. TTP right foot and ankle. +Pedal pulses. skin intact. Fall risk precautions maintained DC instructions

## 2023-10-03 NOTE — CONSULT NOTE ADULT - SUBJECTIVE AND OBJECTIVE BOX
Patient is a 58y old  Female who presents with a chief complaint of left foot wound    HPI:   · HPI Objective Statement: 58yF PMHx IDDM, HLD, HTN, asthma, arthritis, CAD sent by Dr Koch after left ankle injury. Patient reports that on August 17 she cut her medial L ankle with an oxidized tin can and developed severe pain in her L foot, ankle, and calf. Patient unsure when last tetanus shot was. Pt sent to r/o osteomyelitis and obtain DVT studies. To be admitted under Dr Gould        PAST MEDICAL & SURGICAL HISTORY:  Insulin dependent type 2 diabetes mellitus      Hypertension      Arthritis      CAD (coronary artery disease)          MEDICATIONS  (STANDING):    MEDICATIONS  (PRN):      Allergies    No Known Allergies    Intolerances        VITALS:    Vital Signs Last 24 Hrs  T(C): 36.8 (03 Oct 2023 13:58), Max: 36.9 (03 Oct 2023 08:04)  T(F): 98.2 (03 Oct 2023 13:58), Max: 98.5 (03 Oct 2023 08:04)  HR: 76 (03 Oct 2023 13:58) (52 - 76)  BP: 114/69 (03 Oct 2023 13:58) (113/78 - 128/100)  BP(mean): 92 (03 Oct 2023 11:58) (92 - 92)  RR: 18 (03 Oct 2023 13:58) (16 - 20)  SpO2: 98% (03 Oct 2023 13:58) (96% - 99%)    Parameters below as of 03 Oct 2023 13:58  Patient On (Oxygen Delivery Method): room air        LABS:                          12.1   9.61  )-----------( 232      ( 03 Oct 2023 10:14 )             35.6       10-03    138  |  100  |  25<H>  ----------------------------<  206<H>  4.1   |  27  |  0.72    Ca    10.0      03 Oct 2023 10:15    TPro  7.4  /  Alb  4.6  /  TBili  0.2  /  DBili  x   /  AST  25  /  ALT  37  /  AlkPhos  75  10-03      CAPILLARY BLOOD GLUCOSE              LOWER EXTREMITY PHYSICAL EXAM:    Vascular: DP/PT 1/4, B/L, CFT <3 seconds B/L, Temperature gradient warm to cool, B/L.   Neuro: Epicritic sensation diminished to the level of toes, B/L.  Musculoskeletal/Ortho: unremarkable  Skin: left foot medial rearfoot hyperatotic lesion with no drainage, no pus, no malodor, no edema, no redness. Right foot with no acute signs of infection    RADIOLOGY & ADDITIONAL STUDIES:    < from: Xray Foot AP + Lateral + Oblique, Left (10.03.23 @ 10:28) >    ACC: [id number] EXAM:  XR FOOT COMP MIN 3 VIEWS LT   ORDERED BY:  ALESSIA IBARRA     PROCEDURE DATE:  10/03/2023          INTERPRETATION:  CLINICAL INDICATION: History of cut on left medial ankle   which has developed into severe pain involving the left foot, ankle and   calf. Evaluate for osteomyelitis..  TECHNIQUE: Left foot radiograph 3 views.    COMPARISON: None available.    FINDINGS:    No acute fracture. No dislocation. No advanced arthritic changes.   Vascular calcifications. Calcanealenthesophytes. No evidence of cortical   erosions or subcutaneous air. No retained radiopaque foreign body.        IMPRESSION:  No radiographic evidence of osteomyelitis.    No retained radiopaque foreign body.    --- End of Report ---           ADITYA OBRIEN MD; Resident Radiologist  This document has been electronically signed.  CORNELIUS JIMÉNEZ MD; Attending Radiologist  This document has been electronically signed. Oct  3 2023 10:37AM    < end of copied text >

## 2023-10-03 NOTE — ED PROVIDER NOTE - NSICDXPASTMEDICALHX_GEN_ALL_CORE_FT
PAST MEDICAL HISTORY:  Arthritis     CAD (coronary artery disease)     Hypertension     Insulin dependent type 2 diabetes mellitus

## 2023-10-03 NOTE — CONSULT NOTE ADULT - ASSESSMENT
58F w left foot medial hyperkeratotic lesion to dermis  - Patient seen and evaluated with daughter on the phone  - Afebrile, WBC 9.61, ESR 35, CRP 0.4  - Left foot medial rearfoot hyperatotic lesion with no drainage, no pus, no malodor, no edema, no redness. Right foot with no acute signs of infection  - Verbal consent obtained for left foot debridement of wound down to the level of dermis and not beyond, no drainage no pus, applied betadine followed by 4x4 gauze, and gavino  - No wound culture taken 2/2 to no acute signs of infection  - Left foot xrays: no OM, no gas, no foreign body  - No MRI warranted at this time as clinically no acute sign of bone infection, wound only to dermis  - Patient stable for discharge from a  podiatry standpoint  - Please follow up with Dr Choi at the wound care center within one week of discharge from hospital  - Please call [phone] for follow up appointment   - Discussed with attending

## 2023-10-04 PROBLEM — I10 ESSENTIAL (PRIMARY) HYPERTENSION: Chronic | Status: ACTIVE | Noted: 2023-10-03

## 2023-10-04 PROBLEM — E11.9 TYPE 2 DIABETES MELLITUS WITHOUT COMPLICATIONS: Chronic | Status: ACTIVE | Noted: 2023-10-03

## 2023-10-04 PROBLEM — I25.10 ATHEROSCLEROTIC HEART DISEASE OF NATIVE CORONARY ARTERY WITHOUT ANGINA PECTORIS: Chronic | Status: ACTIVE | Noted: 2023-10-03

## 2023-10-04 PROBLEM — M19.90 UNSPECIFIED OSTEOARTHRITIS, UNSPECIFIED SITE: Chronic | Status: ACTIVE | Noted: 2023-10-03

## 2023-10-05 ENCOUNTER — APPOINTMENT (OUTPATIENT)
Dept: ORTHOPEDIC SURGERY | Facility: CLINIC | Age: 58
End: 2023-10-05
Payer: MEDICARE

## 2023-10-05 VITALS — HEIGHT: 60 IN | WEIGHT: 145 LBS | BODY MASS INDEX: 28.47 KG/M2

## 2023-10-05 PROCEDURE — 72100 X-RAY EXAM L-S SPINE 2/3 VWS: CPT

## 2023-10-05 PROCEDURE — 99204 OFFICE O/P NEW MOD 45 MIN: CPT

## 2023-10-05 RX ORDER — METHYLPREDNISOLONE 4 MG/1
4 TABLET ORAL
Qty: 1 | Refills: 1 | Status: ACTIVE | COMMUNITY
Start: 2023-10-05 | End: 1900-01-01

## 2023-10-05 RX ORDER — TIZANIDINE 4 MG/1
4 TABLET ORAL
Qty: 30 | Refills: 0 | Status: ACTIVE | COMMUNITY
Start: 2023-10-05 | End: 1900-01-01

## 2023-10-08 LAB
CULTURE RESULTS: SIGNIFICANT CHANGE UP
CULTURE RESULTS: SIGNIFICANT CHANGE UP
SPECIMEN SOURCE: SIGNIFICANT CHANGE UP
SPECIMEN SOURCE: SIGNIFICANT CHANGE UP

## 2023-10-10 ENCOUNTER — RESULT REVIEW (OUTPATIENT)
Age: 58
End: 2023-10-10

## 2023-10-10 ENCOUNTER — APPOINTMENT (OUTPATIENT)
Dept: MRI IMAGING | Facility: CLINIC | Age: 58
End: 2023-10-10

## 2023-10-10 ENCOUNTER — OUTPATIENT (OUTPATIENT)
Dept: OUTPATIENT SERVICES | Facility: HOSPITAL | Age: 58
LOS: 1 days | End: 2023-10-10
Payer: MEDICARE

## 2023-10-10 PROCEDURE — 72148 MRI LUMBAR SPINE W/O DYE: CPT | Mod: 26

## 2023-10-16 ENCOUNTER — APPOINTMENT (OUTPATIENT)
Dept: ORTHOPEDIC SURGERY | Facility: CLINIC | Age: 58
End: 2023-10-16
Payer: MEDICARE

## 2023-10-16 VITALS
HEART RATE: 71 BPM | HEIGHT: 60 IN | DIASTOLIC BLOOD PRESSURE: 83 MMHG | WEIGHT: 150 LBS | SYSTOLIC BLOOD PRESSURE: 136 MMHG | BODY MASS INDEX: 29.45 KG/M2 | OXYGEN SATURATION: 98 %

## 2023-10-16 DIAGNOSIS — M54.16 RADICULOPATHY, LUMBAR REGION: ICD-10-CM

## 2023-10-16 DIAGNOSIS — M51.26 OTHER INTERVERTEBRAL DISC DISPLACEMENT, LUMBAR REGION: ICD-10-CM

## 2023-10-16 PROCEDURE — 99214 OFFICE O/P EST MOD 30 MIN: CPT

## 2023-12-17 ENCOUNTER — INPATIENT (INPATIENT)
Facility: HOSPITAL | Age: 58
LOS: 1 days | Discharge: LEFT BEFORE DISCHARGE | DRG: 315 | End: 2023-12-19
Attending: INTERNAL MEDICINE | Admitting: INTERNAL MEDICINE
Payer: COMMERCIAL

## 2023-12-17 VITALS
RESPIRATION RATE: 18 BRPM | WEIGHT: 154.98 LBS | OXYGEN SATURATION: 98 % | HEART RATE: 58 BPM | DIASTOLIC BLOOD PRESSURE: 56 MMHG | TEMPERATURE: 99 F | HEIGHT: 60 IN | SYSTOLIC BLOOD PRESSURE: 105 MMHG

## 2023-12-17 DIAGNOSIS — R42 DIZZINESS AND GIDDINESS: ICD-10-CM

## 2023-12-17 LAB
ALBUMIN SERPL ELPH-MCNC: 4 G/DL — SIGNIFICANT CHANGE UP (ref 3.3–5)
ALBUMIN SERPL ELPH-MCNC: 4 G/DL — SIGNIFICANT CHANGE UP (ref 3.3–5)
ALP SERPL-CCNC: 59 U/L — SIGNIFICANT CHANGE UP (ref 40–120)
ALP SERPL-CCNC: 59 U/L — SIGNIFICANT CHANGE UP (ref 40–120)
ALT FLD-CCNC: 18 U/L — SIGNIFICANT CHANGE UP (ref 10–45)
ALT FLD-CCNC: 18 U/L — SIGNIFICANT CHANGE UP (ref 10–45)
ANION GAP SERPL CALC-SCNC: 10 MMOL/L — SIGNIFICANT CHANGE UP (ref 5–17)
ANION GAP SERPL CALC-SCNC: 10 MMOL/L — SIGNIFICANT CHANGE UP (ref 5–17)
APPEARANCE UR: CLEAR — SIGNIFICANT CHANGE UP
APPEARANCE UR: CLEAR — SIGNIFICANT CHANGE UP
APTT BLD: 30.9 SEC — SIGNIFICANT CHANGE UP (ref 24.5–35.6)
APTT BLD: 30.9 SEC — SIGNIFICANT CHANGE UP (ref 24.5–35.6)
AST SERPL-CCNC: 12 U/L — SIGNIFICANT CHANGE UP (ref 10–40)
AST SERPL-CCNC: 12 U/L — SIGNIFICANT CHANGE UP (ref 10–40)
BACTERIA # UR AUTO: NEGATIVE /HPF — SIGNIFICANT CHANGE UP
BACTERIA # UR AUTO: NEGATIVE /HPF — SIGNIFICANT CHANGE UP
BASE EXCESS BLDV CALC-SCNC: -2.7 MMOL/L — LOW (ref -2–3)
BASE EXCESS BLDV CALC-SCNC: -2.7 MMOL/L — LOW (ref -2–3)
BASOPHILS # BLD AUTO: 0.03 K/UL — SIGNIFICANT CHANGE UP (ref 0–0.2)
BASOPHILS # BLD AUTO: 0.03 K/UL — SIGNIFICANT CHANGE UP (ref 0–0.2)
BASOPHILS NFR BLD AUTO: 0.4 % — SIGNIFICANT CHANGE UP (ref 0–2)
BASOPHILS NFR BLD AUTO: 0.4 % — SIGNIFICANT CHANGE UP (ref 0–2)
BILIRUB SERPL-MCNC: 0.1 MG/DL — LOW (ref 0.2–1.2)
BILIRUB SERPL-MCNC: 0.1 MG/DL — LOW (ref 0.2–1.2)
BILIRUB UR-MCNC: NEGATIVE — SIGNIFICANT CHANGE UP
BILIRUB UR-MCNC: NEGATIVE — SIGNIFICANT CHANGE UP
BUN SERPL-MCNC: 41 MG/DL — HIGH (ref 7–23)
BUN SERPL-MCNC: 41 MG/DL — HIGH (ref 7–23)
CA-I SERPL-SCNC: 1.3 MMOL/L — SIGNIFICANT CHANGE UP (ref 1.15–1.33)
CA-I SERPL-SCNC: 1.3 MMOL/L — SIGNIFICANT CHANGE UP (ref 1.15–1.33)
CALCIUM SERPL-MCNC: 9.3 MG/DL — SIGNIFICANT CHANGE UP (ref 8.4–10.5)
CALCIUM SERPL-MCNC: 9.3 MG/DL — SIGNIFICANT CHANGE UP (ref 8.4–10.5)
CAST: 2 /LPF — SIGNIFICANT CHANGE UP (ref 0–4)
CAST: 2 /LPF — SIGNIFICANT CHANGE UP (ref 0–4)
CHLORIDE BLDV-SCNC: 105 MMOL/L — SIGNIFICANT CHANGE UP (ref 96–108)
CHLORIDE BLDV-SCNC: 105 MMOL/L — SIGNIFICANT CHANGE UP (ref 96–108)
CHLORIDE SERPL-SCNC: 105 MMOL/L — SIGNIFICANT CHANGE UP (ref 96–108)
CHLORIDE SERPL-SCNC: 105 MMOL/L — SIGNIFICANT CHANGE UP (ref 96–108)
CO2 BLDV-SCNC: 26 MMOL/L — SIGNIFICANT CHANGE UP (ref 22–26)
CO2 BLDV-SCNC: 26 MMOL/L — SIGNIFICANT CHANGE UP (ref 22–26)
CO2 SERPL-SCNC: 22 MMOL/L — SIGNIFICANT CHANGE UP (ref 22–31)
CO2 SERPL-SCNC: 22 MMOL/L — SIGNIFICANT CHANGE UP (ref 22–31)
COLOR SPEC: YELLOW — SIGNIFICANT CHANGE UP
COLOR SPEC: YELLOW — SIGNIFICANT CHANGE UP
CREAT SERPL-MCNC: 0.99 MG/DL — SIGNIFICANT CHANGE UP (ref 0.5–1.3)
CREAT SERPL-MCNC: 0.99 MG/DL — SIGNIFICANT CHANGE UP (ref 0.5–1.3)
DIFF PNL FLD: NEGATIVE — SIGNIFICANT CHANGE UP
DIFF PNL FLD: NEGATIVE — SIGNIFICANT CHANGE UP
EGFR: 67 ML/MIN/1.73M2 — SIGNIFICANT CHANGE UP
EGFR: 67 ML/MIN/1.73M2 — SIGNIFICANT CHANGE UP
EOSINOPHIL # BLD AUTO: 0.11 K/UL — SIGNIFICANT CHANGE UP (ref 0–0.5)
EOSINOPHIL # BLD AUTO: 0.11 K/UL — SIGNIFICANT CHANGE UP (ref 0–0.5)
EOSINOPHIL NFR BLD AUTO: 1.6 % — SIGNIFICANT CHANGE UP (ref 0–6)
EOSINOPHIL NFR BLD AUTO: 1.6 % — SIGNIFICANT CHANGE UP (ref 0–6)
FLUAV AG NPH QL: SIGNIFICANT CHANGE UP
FLUAV AG NPH QL: SIGNIFICANT CHANGE UP
FLUBV AG NPH QL: SIGNIFICANT CHANGE UP
FLUBV AG NPH QL: SIGNIFICANT CHANGE UP
GAS PNL BLDV: 135 MMOL/L — LOW (ref 136–145)
GAS PNL BLDV: 135 MMOL/L — LOW (ref 136–145)
GAS PNL BLDV: SIGNIFICANT CHANGE UP
GLUCOSE BLDV-MCNC: 177 MG/DL — HIGH (ref 70–99)
GLUCOSE BLDV-MCNC: 177 MG/DL — HIGH (ref 70–99)
GLUCOSE SERPL-MCNC: 176 MG/DL — HIGH (ref 70–99)
GLUCOSE SERPL-MCNC: 176 MG/DL — HIGH (ref 70–99)
GLUCOSE UR QL: NEGATIVE MG/DL — SIGNIFICANT CHANGE UP
GLUCOSE UR QL: NEGATIVE MG/DL — SIGNIFICANT CHANGE UP
HCO3 BLDV-SCNC: 24 MMOL/L — SIGNIFICANT CHANGE UP (ref 22–29)
HCO3 BLDV-SCNC: 24 MMOL/L — SIGNIFICANT CHANGE UP (ref 22–29)
HCT VFR BLD CALC: 28.3 % — LOW (ref 34.5–45)
HCT VFR BLD CALC: 28.3 % — LOW (ref 34.5–45)
HCT VFR BLDA CALC: 29 % — LOW (ref 34.5–46.5)
HCT VFR BLDA CALC: 29 % — LOW (ref 34.5–46.5)
HGB BLD CALC-MCNC: 9.7 G/DL — LOW (ref 11.7–16.1)
HGB BLD CALC-MCNC: 9.7 G/DL — LOW (ref 11.7–16.1)
HGB BLD-MCNC: 9.3 G/DL — LOW (ref 11.5–15.5)
HGB BLD-MCNC: 9.3 G/DL — LOW (ref 11.5–15.5)
IMM GRANULOCYTES NFR BLD AUTO: 0.4 % — SIGNIFICANT CHANGE UP (ref 0–0.9)
IMM GRANULOCYTES NFR BLD AUTO: 0.4 % — SIGNIFICANT CHANGE UP (ref 0–0.9)
INR BLD: 1.02 RATIO — SIGNIFICANT CHANGE UP (ref 0.85–1.18)
INR BLD: 1.02 RATIO — SIGNIFICANT CHANGE UP (ref 0.85–1.18)
KETONES UR-MCNC: NEGATIVE MG/DL — SIGNIFICANT CHANGE UP
KETONES UR-MCNC: NEGATIVE MG/DL — SIGNIFICANT CHANGE UP
LACTATE BLDV-MCNC: 0.7 MMOL/L — SIGNIFICANT CHANGE UP (ref 0.5–2)
LACTATE BLDV-MCNC: 0.7 MMOL/L — SIGNIFICANT CHANGE UP (ref 0.5–2)
LEUKOCYTE ESTERASE UR-ACNC: ABNORMAL
LEUKOCYTE ESTERASE UR-ACNC: ABNORMAL
LYMPHOCYTES # BLD AUTO: 3.3 K/UL — SIGNIFICANT CHANGE UP (ref 1–3.3)
LYMPHOCYTES # BLD AUTO: 3.3 K/UL — SIGNIFICANT CHANGE UP (ref 1–3.3)
LYMPHOCYTES # BLD AUTO: 47.7 % — HIGH (ref 13–44)
LYMPHOCYTES # BLD AUTO: 47.7 % — HIGH (ref 13–44)
MCHC RBC-ENTMCNC: 30.6 PG — SIGNIFICANT CHANGE UP (ref 27–34)
MCHC RBC-ENTMCNC: 30.6 PG — SIGNIFICANT CHANGE UP (ref 27–34)
MCHC RBC-ENTMCNC: 32.9 GM/DL — SIGNIFICANT CHANGE UP (ref 32–36)
MCHC RBC-ENTMCNC: 32.9 GM/DL — SIGNIFICANT CHANGE UP (ref 32–36)
MCV RBC AUTO: 93.1 FL — SIGNIFICANT CHANGE UP (ref 80–100)
MCV RBC AUTO: 93.1 FL — SIGNIFICANT CHANGE UP (ref 80–100)
MONOCYTES # BLD AUTO: 0.36 K/UL — SIGNIFICANT CHANGE UP (ref 0–0.9)
MONOCYTES # BLD AUTO: 0.36 K/UL — SIGNIFICANT CHANGE UP (ref 0–0.9)
MONOCYTES NFR BLD AUTO: 5.2 % — SIGNIFICANT CHANGE UP (ref 2–14)
MONOCYTES NFR BLD AUTO: 5.2 % — SIGNIFICANT CHANGE UP (ref 2–14)
NEUTROPHILS # BLD AUTO: 3.09 K/UL — SIGNIFICANT CHANGE UP (ref 1.8–7.4)
NEUTROPHILS # BLD AUTO: 3.09 K/UL — SIGNIFICANT CHANGE UP (ref 1.8–7.4)
NEUTROPHILS NFR BLD AUTO: 44.7 % — SIGNIFICANT CHANGE UP (ref 43–77)
NEUTROPHILS NFR BLD AUTO: 44.7 % — SIGNIFICANT CHANGE UP (ref 43–77)
NITRITE UR-MCNC: NEGATIVE — SIGNIFICANT CHANGE UP
NITRITE UR-MCNC: NEGATIVE — SIGNIFICANT CHANGE UP
NRBC # BLD: 0 /100 WBCS — SIGNIFICANT CHANGE UP (ref 0–0)
NRBC # BLD: 0 /100 WBCS — SIGNIFICANT CHANGE UP (ref 0–0)
PCO2 BLDV: 50 MMHG — HIGH (ref 39–42)
PCO2 BLDV: 50 MMHG — HIGH (ref 39–42)
PH BLDV: 7.29 — LOW (ref 7.32–7.43)
PH BLDV: 7.29 — LOW (ref 7.32–7.43)
PH UR: 5 — SIGNIFICANT CHANGE UP (ref 5–8)
PH UR: 5 — SIGNIFICANT CHANGE UP (ref 5–8)
PLATELET # BLD AUTO: 245 K/UL — SIGNIFICANT CHANGE UP (ref 150–400)
PLATELET # BLD AUTO: 245 K/UL — SIGNIFICANT CHANGE UP (ref 150–400)
PO2 BLDV: 33 MMHG — SIGNIFICANT CHANGE UP (ref 25–45)
PO2 BLDV: 33 MMHG — SIGNIFICANT CHANGE UP (ref 25–45)
POTASSIUM BLDV-SCNC: 4.4 MMOL/L — SIGNIFICANT CHANGE UP (ref 3.5–5.1)
POTASSIUM BLDV-SCNC: 4.4 MMOL/L — SIGNIFICANT CHANGE UP (ref 3.5–5.1)
POTASSIUM SERPL-MCNC: 4.3 MMOL/L — SIGNIFICANT CHANGE UP (ref 3.5–5.3)
POTASSIUM SERPL-MCNC: 4.3 MMOL/L — SIGNIFICANT CHANGE UP (ref 3.5–5.3)
POTASSIUM SERPL-SCNC: 4.3 MMOL/L — SIGNIFICANT CHANGE UP (ref 3.5–5.3)
POTASSIUM SERPL-SCNC: 4.3 MMOL/L — SIGNIFICANT CHANGE UP (ref 3.5–5.3)
PROT SERPL-MCNC: 6.2 G/DL — SIGNIFICANT CHANGE UP (ref 6–8.3)
PROT SERPL-MCNC: 6.2 G/DL — SIGNIFICANT CHANGE UP (ref 6–8.3)
PROT UR-MCNC: NEGATIVE MG/DL — SIGNIFICANT CHANGE UP
PROT UR-MCNC: NEGATIVE MG/DL — SIGNIFICANT CHANGE UP
PROTHROM AB SERPL-ACNC: 11.2 SEC — SIGNIFICANT CHANGE UP (ref 9.5–13)
PROTHROM AB SERPL-ACNC: 11.2 SEC — SIGNIFICANT CHANGE UP (ref 9.5–13)
RBC # BLD: 3.04 M/UL — LOW (ref 3.8–5.2)
RBC # BLD: 3.04 M/UL — LOW (ref 3.8–5.2)
RBC # FLD: 12.6 % — SIGNIFICANT CHANGE UP (ref 10.3–14.5)
RBC # FLD: 12.6 % — SIGNIFICANT CHANGE UP (ref 10.3–14.5)
RBC CASTS # UR COMP ASSIST: 0 /HPF — SIGNIFICANT CHANGE UP (ref 0–4)
RBC CASTS # UR COMP ASSIST: 0 /HPF — SIGNIFICANT CHANGE UP (ref 0–4)
REVIEW: SIGNIFICANT CHANGE UP
REVIEW: SIGNIFICANT CHANGE UP
RSV RNA NPH QL NAA+NON-PROBE: SIGNIFICANT CHANGE UP
RSV RNA NPH QL NAA+NON-PROBE: SIGNIFICANT CHANGE UP
SAO2 % BLDV: 52.4 % — LOW (ref 67–88)
SAO2 % BLDV: 52.4 % — LOW (ref 67–88)
SARS-COV-2 RNA SPEC QL NAA+PROBE: SIGNIFICANT CHANGE UP
SARS-COV-2 RNA SPEC QL NAA+PROBE: SIGNIFICANT CHANGE UP
SODIUM SERPL-SCNC: 137 MMOL/L — SIGNIFICANT CHANGE UP (ref 135–145)
SODIUM SERPL-SCNC: 137 MMOL/L — SIGNIFICANT CHANGE UP (ref 135–145)
SP GR SPEC: 1.02 — SIGNIFICANT CHANGE UP (ref 1–1.03)
SP GR SPEC: 1.02 — SIGNIFICANT CHANGE UP (ref 1–1.03)
SQUAMOUS # UR AUTO: 5 /HPF — SIGNIFICANT CHANGE UP (ref 0–5)
SQUAMOUS # UR AUTO: 5 /HPF — SIGNIFICANT CHANGE UP (ref 0–5)
TROPONIN T, HIGH SENSITIVITY RESULT: 9 NG/L — SIGNIFICANT CHANGE UP (ref 0–51)
TROPONIN T, HIGH SENSITIVITY RESULT: 9 NG/L — SIGNIFICANT CHANGE UP (ref 0–51)
UROBILINOGEN FLD QL: 0.2 MG/DL — SIGNIFICANT CHANGE UP (ref 0.2–1)
UROBILINOGEN FLD QL: 0.2 MG/DL — SIGNIFICANT CHANGE UP (ref 0.2–1)
WBC # BLD: 6.92 K/UL — SIGNIFICANT CHANGE UP (ref 3.8–10.5)
WBC # BLD: 6.92 K/UL — SIGNIFICANT CHANGE UP (ref 3.8–10.5)
WBC # FLD AUTO: 6.92 K/UL — SIGNIFICANT CHANGE UP (ref 3.8–10.5)
WBC # FLD AUTO: 6.92 K/UL — SIGNIFICANT CHANGE UP (ref 3.8–10.5)
WBC UR QL: 5 /HPF — SIGNIFICANT CHANGE UP (ref 0–5)
WBC UR QL: 5 /HPF — SIGNIFICANT CHANGE UP (ref 0–5)

## 2023-12-17 PROCEDURE — 70496 CT ANGIOGRAPHY HEAD: CPT | Mod: 26,MA

## 2023-12-17 PROCEDURE — 71045 X-RAY EXAM CHEST 1 VIEW: CPT | Mod: 26

## 2023-12-17 PROCEDURE — 70450 CT HEAD/BRAIN W/O DYE: CPT | Mod: 26,MA,XU

## 2023-12-17 PROCEDURE — 99285 EMERGENCY DEPT VISIT HI MDM: CPT

## 2023-12-17 PROCEDURE — 70498 CT ANGIOGRAPHY NECK: CPT | Mod: 26,MA

## 2023-12-17 RX ORDER — SODIUM CHLORIDE 9 MG/ML
1000 INJECTION, SOLUTION INTRAVENOUS
Refills: 0 | Status: DISCONTINUED | OUTPATIENT
Start: 2023-12-17 | End: 2023-12-19

## 2023-12-17 RX ORDER — SODIUM CHLORIDE 9 MG/ML
1000 INJECTION INTRAMUSCULAR; INTRAVENOUS; SUBCUTANEOUS ONCE
Refills: 0 | Status: COMPLETED | OUTPATIENT
Start: 2023-12-17 | End: 2023-12-17

## 2023-12-17 RX ORDER — METOPROLOL TARTRATE 50 MG
1 TABLET ORAL
Refills: 0 | DISCHARGE

## 2023-12-17 RX ORDER — FENOFIBRATE,MICRONIZED 130 MG
1 CAPSULE ORAL
Refills: 0 | DISCHARGE

## 2023-12-17 RX ORDER — INSULIN ASPART 100 [IU]/ML
6 INJECTION, SOLUTION SUBCUTANEOUS
Refills: 0 | DISCHARGE

## 2023-12-17 RX ORDER — ATORVASTATIN CALCIUM 80 MG/1
1 TABLET, FILM COATED ORAL
Refills: 0 | DISCHARGE

## 2023-12-17 RX ORDER — ACETAMINOPHEN 500 MG
3 TABLET ORAL
Refills: 0 | DISCHARGE

## 2023-12-17 RX ORDER — ACETAMINOPHEN 500 MG
1000 TABLET ORAL ONCE
Refills: 0 | Status: COMPLETED | OUTPATIENT
Start: 2023-12-17 | End: 2023-12-17

## 2023-12-17 RX ORDER — ACETAMINOPHEN 500 MG
650 TABLET ORAL EVERY 6 HOURS
Refills: 0 | Status: DISCONTINUED | OUTPATIENT
Start: 2023-12-17 | End: 2023-12-19

## 2023-12-17 RX ORDER — SODIUM CHLORIDE 9 MG/ML
1000 INJECTION INTRAMUSCULAR; INTRAVENOUS; SUBCUTANEOUS ONCE
Refills: 0 | Status: DISCONTINUED | OUTPATIENT
Start: 2023-12-17 | End: 2023-12-17

## 2023-12-17 RX ORDER — DOCUSATE SODIUM 100 MG
1 CAPSULE ORAL
Refills: 0 | DISCHARGE

## 2023-12-17 RX ORDER — ASPIRIN/CALCIUM CARB/MAGNESIUM 324 MG
1 TABLET ORAL
Refills: 0 | DISCHARGE

## 2023-12-17 RX ORDER — LANOLIN ALCOHOL/MO/W.PET/CERES
3 CREAM (GRAM) TOPICAL AT BEDTIME
Refills: 0 | Status: DISCONTINUED | OUTPATIENT
Start: 2023-12-17 | End: 2023-12-19

## 2023-12-17 RX ORDER — GABAPENTIN 400 MG/1
1 CAPSULE ORAL
Refills: 0 | DISCHARGE

## 2023-12-17 RX ORDER — SODIUM CHLORIDE 9 MG/ML
500 INJECTION INTRAMUSCULAR; INTRAVENOUS; SUBCUTANEOUS ONCE
Refills: 0 | Status: COMPLETED | OUTPATIENT
Start: 2023-12-17 | End: 2023-12-17

## 2023-12-17 RX ORDER — INSULIN GLARGINE 100 [IU]/ML
20 INJECTION, SOLUTION SUBCUTANEOUS
Refills: 0 | DISCHARGE

## 2023-12-17 RX ORDER — ISOSORBIDE MONONITRATE 60 MG/1
1 TABLET, EXTENDED RELEASE ORAL
Refills: 0 | DISCHARGE

## 2023-12-17 RX ORDER — SENNA PLUS 8.6 MG/1
1 TABLET ORAL
Refills: 0 | DISCHARGE

## 2023-12-17 RX ORDER — MONTELUKAST 4 MG/1
1 TABLET, CHEWABLE ORAL
Refills: 0 | DISCHARGE

## 2023-12-17 RX ORDER — ALBUTEROL 90 UG/1
2 AEROSOL, METERED ORAL
Refills: 0 | DISCHARGE

## 2023-12-17 RX ADMIN — SODIUM CHLORIDE 1000 MILLILITER(S): 9 INJECTION INTRAMUSCULAR; INTRAVENOUS; SUBCUTANEOUS at 22:54

## 2023-12-17 RX ADMIN — SODIUM CHLORIDE 500 MILLILITER(S): 9 INJECTION INTRAMUSCULAR; INTRAVENOUS; SUBCUTANEOUS at 19:13

## 2023-12-17 RX ADMIN — SODIUM CHLORIDE 1000 MILLILITER(S): 9 INJECTION INTRAMUSCULAR; INTRAVENOUS; SUBCUTANEOUS at 13:40

## 2023-12-17 RX ADMIN — Medication 400 MILLIGRAM(S): at 19:13

## 2023-12-17 NOTE — ED PROVIDER NOTE - ATTENDING APP SHARED VISIT CONTRIBUTION OF CARE
attending Pollack: 58yF h/o IDDM, HLD, HTN, asthma, arthritis, CAD, p/w dizziness, visual disturbance x three days. Initially called as code stroke, later cancelled due to patient not in the window. Pt reports feeling generally weak. Has been worked up for similar dizziness in the past. Will obtain ekg, labs, place on tele, UA/Ucx, CT imaging, reassess

## 2023-12-17 NOTE — H&P ADULT - HISTORY OF PRESENT ILLNESS
58F [place]-speaking w/ 3V CABG (2023), HTN, HLD, IDDM, Wegener's granulomatosis, cocaine use s/p nose surgery?, Chronic back pain w/radiculopathy presents with dizziness and presyncope, described as blacking out". Patient reports has passed out in the past and this felt similar but she did not lose consciousness. Dizziness described as feeling like the "room was gonna fall on her". She had similar symptoms 3 days ago that resolved on their own with rest. She also reports significant back pain for which she has had multiple shots in her back for and reports she has been taking 650mg of tylenol every 2-3 hours for her pain. pain radiates down LLE and causes significant LLE pain. Patient was admitted months ago for LLE pain and minor wound that has since healed. She has had multiple falls in the past but did not fall as a result of this dizziness. Symptoms have resolved now that BP has improved other than her back and LLE pain. Patient denies fever, chills, chest pain, SOB, abd pain, nausea, vomiting, constipation, dysuria. 58F [place]ic-speaking w/ 3V CABG (2023), HTN, HLD, IDDM, Wegener's granulomatosis, cocaine use s/p nose surgery?, Chronic back pain w/radiculopathy presents with dizziness and presyncope, described as blacking out". Patient reports has passed out in the past and this felt similar but she did not lose consciousness. Dizziness described as feeling like the "room was gonna fall on her". She had similar symptoms 3 days ago that resolved on their own with rest. She also reports significant back pain for which she has had multiple shots in her back for and reports she has been taking 650mg of tylenol every 2-3 hours for her pain. pain radiates down LLE and causes significant LLE pain. Patient was admitted months ago for LLE pain and minor wound that has since healed. She has had multiple falls in the past but did not fall as a result of this dizziness. Symptoms have resolved now that BP has improved other than her back and LLE pain. Patient denies fever, chills, chest pain, SOB, abd pain, nausea, vomiting, constipation, dysuria.

## 2023-12-17 NOTE — ED PROVIDER NOTE - PROGRESS NOTE DETAILS
Ketty Hoyos PA-C: labs and imaging reviewed. hgb 9.3. patient refusing rectal exam. neurology paged. pending consult at this time. discussed with ED attending. Ketty Hoyos PA-C: labs and imaging reviewed. hgb 9.3. patient refusing rectal exam. neurology paged for patient dizziness. pending consult at this time. discussed with ED attending. Ketty zimmerman PA-C: spoke with neuro. pending consult at this time.

## 2023-12-17 NOTE — CONSULT NOTE ADULT - CRITICAL CARE ATTENDING COMMENT
DOS 12/18  code stroke called on arrival for f/o stroke and neurolgoy emergently assessed patient   Briefly     58 Woman PMHX  triple bypass, hypertension, hyperlipidemia, insulin-dependent diabetes, Wegener's granulomatosis, former cocaine use, nose surgery, nose trauma, "sciatica ", falls (>1 month ago) t presents with "dizziness "and visual disturbance found to have hypotension to 70s/40s. BP and symptoms improved on fluids  LKN: Dec 14 2023  NIHSS: 0  preMRS: 0   Pt is not a candidate for tenecteplase due to outside tenecteplase window and low concern for acute ischemic stroke/asymptomatic at time of exam   Pt is not a candidate for mechanical thrombectomy due to no large vessel occlusion on CTA and outside window and low concern for acute ischemic stroke/asymptomatic at time of exam   CTH neg: infl;ammaotry change in nasal cavinate noted   CTA H/N neg   12/18 AM back to [facility]     Impression: Dizziness (feels as if things will fall on her, feels as if on boat) a/w light headed sensation and darkening of binocular vision likely due to hypotension (with possible vertebral focal stenosis at their origins). Low suspicion for acute ischemic stroke or TIA but cannot rule out     Recommendations  - If symptoms recur can pursue MRI Brain noncon  - Continue Aspirin  - Clarify home cholesterol medication and continue; LDL goal < 70mg/dL   - NPO unless passes dysphagia screen; swallow eval if fails  - DVT ppx: lovenox sq daily +  SCDs  - Rehabilitation: PT/OT consults  - Check HgbA1C, fasting lipid panel, utox, Tele, TTE, glucose control, B12, Folate, TSH, Free T4  - Orthostatic blood pressure   - Anemia, hypotension, acidosis, sterile pyuria per primary  - Continue outpatient pain management for what is described as "sciatica" and likely reflects a lumbar radiculopathy    Haile Starr MD  Vascular Neurology  Office: [phone] DOS 12/18  code stroke called on arrival for f/o stroke and neurolgoy emergently assessed patient   Briefly     58 Woman PMHX  triple bypass, hypertension, hyperlipidemia, insulin-dependent diabetes, Wegener's granulomatosis, former cocaine use, nose surgery, nose trauma, "sciatica ", falls (>1 month ago) t presents with "dizziness "and visual disturbance found to have hypotension to 70s/40s. BP and symptoms improved on fluids  LKN: Dec 14 2023  NIHSS: 0  preMRS: 0   Pt is not a candidate for tenecteplase due to outside tenecteplase window and low concern for acute ischemic stroke/asymptomatic at time of exam   Pt is not a candidate for mechanical thrombectomy due to no large vessel occlusion on CTA and outside window and low concern for acute ischemic stroke/asymptomatic at time of exam   CTH neg: infl;ammaotry change in nasal cavinate noted   CTA H/N neg   12/18 AM back to [facility]     Impression: Dizziness (feels as if things will fall on her, feels as if on boat) a/w light headed sensation and darkening of binocular vision likely due to hypotension (with possible vertebral focal stenosis at their origins). Low suspicion for acute ischemic stroke or TIA but cannot rule out     Recommendations  - If symptoms recur can pursue MRI Brain noncon  - Continue Aspirin  - Clarify home cholesterol medication and continue; LDL goal < 70mg/dL   - NPO unless passes dysphagia screen; swallow eval if fails  - DVT ppx: lovenox sq daily +  SCDs  - Rehabilitation: PT/OT consults  - Check HgbA1C, fasting lipid panel, utox, Tele, TTE, glucose control, B12, Folate, TSH, Free T4  - Orthostatic blood pressure   - Anemia, hypotension, acidosis, sterile pyuria per primary  - Continue outpatient pain management for what is described as "sciatica" and likely reflects a lumbar radiculopathy    Haile Starr MD  Vascular Neurology  Office: [phone]

## 2023-12-17 NOTE — CONSULT NOTE ADULT - SUBJECTIVE AND OBJECTIVE BOX
Neurology - Consult Note    -  Spectra: [phone] ([facility]), [phone] ([facility])  -    Icelandic translation provided by daughter at bedside , which was the patient's preference .  She declined use of  .    58-year-old female with past medical history of triple bypass earlier this year, hypertension, hyperlipidemia, insulin-dependent diabetes, Wegener's granulomatosis, cocaine use, nose surgery, nose trauma, "sciatica ", frequent falls though less frequent lately (last fall >1 month ago, trpped on water) presents with "dizziness "and visual disturbance.     3 days prior to presentation, she was feeling "dizzy "which she described as feeling like everything was going to fall on her, she also felt like she was on a boat, this was associated with lightheaded sensation and darkening of her complete vision for up to 3 minutes.  She refused hospitalization 3 days ago and improved with oral intake and rest, but now presents again for recurrence of symptoms.      She has "sciatica" for which she has had at least 2 epidurals, she believes her prior falls are due to left leg weakness, her fall started in 2015. Last 2 falls in novemebr 2023.     ED course significant for code stroke which was canceled given last known normal greater than 3 days ago, hypotension to the 70s over 40s, improved status post 1.5 L of normal saline and 1 L of IV acetaminophen.  She noted symptomatic improvement in that time.     Notes worsening of LLE pain and subjective weakness. no saddle anesthesia or incontinence. On gabapentin 100mg QID      LKN: Dec 14 2023  NIHSS: 0  preMRS: 0   Pt is not a candidate for tenecteplase due to outside tenecteplase window and low concern for acute ischemic stroke/asymptomatic at time of exam   Pt is not a candidate for mechanical thrombectomy due to no large vessel occlusion on CTA and outside window and low concern for acute ischemic stroke/asymptomatic at time of exam     Review of Systems:    CONSTITUTIONAL: +chills and sweats attributed to menopause   EYES AND ENT: as above  NECK: No pain or stiffness  RESPIRATORY: VELASQUEZ   CARDIOVASCULAR: occasional palpitations   GASTROINTESTINAL: No melena or hematochezia  GENITOURINARY: No dysuria or hematuria  NEUROLOGICAL: +As stated in HPI above  SKIN: No itching, burning, rashes, or lesions   All other review of systems is negative unless indicated above.    Allergies:  No Known Allergies      PMHx/PSHx/Family Hx: As above, otherwise see below   Insulin dependent type 2 diabetes mellitus    Hypertension    Arthritis    CAD (coronary artery disease)        Social Hx:  former cocaine use   Lives with daughter    Medications:  MEDICATIONS  (STANDING):    MEDICATIONS  (PRN):      Vitals:  T(C): 36.7 (12-17-23 @ 17:08), Max: 37.1 (12-17-23 @ 13:07)  HR: 51 (12-17-23 @ 17:08) (51 - 59)  BP: 96/53 (12-17-23 @ 17:08) (75/48 - 105/56)  RR: 16 (12-17-23 @ 18:00) (16 - 18)  SpO2: 99% (12-17-23 @ 18:00) (98% - 99%)    Physical Examination:   General - NAD    Eyes - undoscopy not well visualize  Neurologic Exam:  Mental status - Awake, Alert, Oriented to person, place, and time. Speech fluent, repetition and naming intact. Follows simple and complex commands.    Cranial nerves - PERRL, fundoscopy not well visualized, VFF, EOMI without nystagmus, face sensation (V1-V3) intact b/l, facial strength intact without asymmetry b/l, hearing intact b/l, palate with symmetric elevation, trapezius  5/5 strength b/l, tongue midline on protrusion with full lateral movement    HINTS no preformed as patient was asymptomatic at time of exam    Motor - Normal bulk and tone throughout. No pronator drift. no leg drift  Strength testing            Deltoid      Biceps      Triceps     Wrist Extension    Wrist Flexion     Interossei         R            5                 5               5                     5                              5                        5                 5  L             5                 5               5                     5                              5                        5                 5              Hip Flexion    Hip Extension    Knee Flexion    Knee Extension    Dorsiflexion    Plantar Flexion  R              5                           5                       5                           5                            5                          5  L              5                           5                        5                           5                            5                          5    Left leg movement causes pain but is not weaker than right     Sensation - Light touch intact throughout, vibration intact in big toes, felt less on right side compared to left     DTR's -  Diffusely and symmetrically hyporeflexic, plantar is down b/l     Coordination - Finger to Nose intact b/l. No tremors appreciated. Rapid finger rapping, rapid alternatiing movements intact.     Gait and station - Normal casual gait. Romberg (-)    Labs:                        9.3    6.92  )-----------( 245      ( 17 Dec 2023 14:40 )             28.3     12-17    137  |  105  |  41<H>  ----------------------------<  176<H>  4.3   |  22  |  0.99    Ca    9.3      17 Dec 2023 14:40    TPro  6.2  /  Alb  4.0  /  TBili  0.1<L>  /  DBili  x   /  AST  12  /  ALT  18  /  AlkPhos  59  12-17    CAPILLARY BLOOD GLUCOSE      POCT Blood Glucose.: 239 mg/dL (17 Dec 2023 13:07)    LIVER FUNCTIONS - ( 17 Dec 2023 14:40 )  Alb: 4.0 g/dL / Pro: 6.2 g/dL / ALK PHOS: 59 U/L / ALT: 18 U/L / AST: 12 U/L / GGT: x             PT/INR - ( 17 Dec 2023 14:40 )   PT: 11.2 sec;   INR: 1.02 ratio         PTT - ( 17 Dec 2023 14:40 )  PTT:30.9 sec  CSF:                  Radiology:  CT Head No Cont:  (17 Dec 2023 16:30)  < from: CT Angio Neck w/ IV Cont (12.17.23 @ 16:34) >    ACC: [id number] EXAM:  CT ANGIO BRAIN (W)AW IC   ORDERED BY: BEAR GISPON     ACC: [id number] EXAM:  CT ANGIO NECK (W)AW IC   ORDERED BY: BEAR GIPSON     ACC: [id number] EXAM:  CT BRAIN   ORDERED BY: BEAR GIPSON     PROCEDURE DATE:  12/17/2023          INTERPRETATION:  HISTORY: Dizziness.    COMPARISON: None.    TECHNIQUE: Noncontrast CT head and CT angiogram of the neck and brain was   performed after administration of intravenous contrast. MIP and 3D   reconstructions were performed.    Total of 90 cc Omnipaque 350 intravenous contrast were administered   without complication.    FINDINGS:    CT HEAD:    There is no acute intracranial mass-effect, hemorrhage, midline shift, or   abnormal extra-axial fluid collection.    Ventricles, sulci, and cisterns are normal in size for the patient's age   without hydrocephalus. Basal cisterns are patent.    Complete opacification of a hypoplastic sclerotic right mastoid. Clear   hypoplastic left mastoid.    Scattered ethmoid sinus mucosal thickening bilaterally. Remaining   visualized paranasal sinuses and mastoid air cells are clear. Calvarium   is intact.    Partial absence of the nasal septum and partial absence of the right   inferior turbinate.    CTA BRAIN:    Suboptimal arterial opacification.    INTERNAL CAROTID ARTERIES: Atheromatous irregularity and mild stenoses   along the carotid siphons bilaterally, namely the bilateral cavernous,   right clinoid/supraclinoid segments. The ICA bifurcations are   unremarkable.    ANTERIOR CEREBRAL ARTERIES: Patent without flow-limiting stenosis or   occlusion. Anterior communicating artery is unremarkable without aneurysm.    MIDDLE CEREBRAL ARTERIES: Patent without flow-limiting stenosis or   occlusion. MCA bifurcations are unremarkable without aneurysm.    POSTERIOR CEREBRAL ARTERIES: Patent without flow-limiting stenosis or   occlusion. Fetal right PCA origin with corresponding aplastic P1 segment.   The left posterior communicating artery is not well seen.    VERTEBROBASILAR SYSTEM: Patent without flow-limiting stenosis or   occlusion.    CTA NECK:    RIGHT CAROTID SYSTEM: Noncalcified and calcified plaque produces mild   stenosis along the common carotid artery (CCA). Scattered calcified   plaque along the distal ICA segment. The ICA is patent without   flow-limiting stenosis or occlusion.    LEFT CAROTID SYSTEM: Noncalcified and calcified plaque produces mild   stenosis along the CCA. Scattered calcified plaque along the carotid bulb   and ICA origin.    VERTEBRAL SYSTEM: Normal in course and caliber without flow-limiting   stenosis or occlusion. Calcified plaque along the origin of the vertebral   arteries bilaterally may contribute to focal stenoses.    AORTIC ARCH: Calcified plaque along the aortic arch.    Changes from median sternotomy and CABG are partially imaged.  Enlarged main pulmonary artery segment to 3.3 cm    IMPRESSION:    CT HEAD: No acute intracranial bleeding.    Partial absence of the nasal cavity and right inferior turbinate may be  postoperative or sequelae of infectious/inflammatory etiology, such as   Wegener's or tuberculosis, or drug abuse. Correlate with patient's   medical history and surgical history.    Opacified hypoplastic sclerotic right mastoid. Correlate for mastoiditis.    CTA BRAIN: Atheromatous irregularity and mild stenoses along the carotid   siphons bilaterally.    CTA NECK: Calcified plaque along the origin of the vertebral arteries   bilaterally may contribute to focal stenoses.    Mild CCA stenosis due to noncalcified and calcified plaque.    --- End of Report ---            JOSIE CAVANAUGH MD; Attending Radiologist  This document has been electronically signed. Dec 17 2023  5:03PM    < end of copied text >     Neurology - Consult Note    -  Spectra: [phone] ([facility]), [phone] ([facility])  -    Andorran translation provided by daughter at bedside , which was the patient's preference .  She declined use of  .    58-year-old female with past medical history of triple bypass earlier this year, hypertension, hyperlipidemia, insulin-dependent diabetes, Wegener's granulomatosis, cocaine use, nose surgery, nose trauma, "sciatica ", frequent falls though less frequent lately (last fall >1 month ago, trpped on water) presents with "dizziness "and visual disturbance.     3 days prior to presentation, she was feeling "dizzy "which she described as feeling like everything was going to fall on her, she also felt like she was on a boat, this was associated with lightheaded sensation and darkening of her complete vision for up to 3 minutes.  She refused hospitalization 3 days ago and improved with oral intake and rest, but now presents again for recurrence of symptoms.      She has "sciatica" for which she has had at least 2 epidurals, she believes her prior falls are due to left leg weakness, her fall started in 2015. Last 2 falls in novemebr 2023.     ED course significant for code stroke which was canceled given last known normal greater than 3 days ago, hypotension to the 70s over 40s, improved status post 1.5 L of normal saline and 1 L of IV acetaminophen.  She noted symptomatic improvement in that time.     Notes worsening of LLE pain and subjective weakness. no saddle anesthesia or incontinence. On gabapentin 100mg QID      LKN: Dec 14 2023  NIHSS: 0  preMRS: 0   Pt is not a candidate for tenecteplase due to outside tenecteplase window and low concern for acute ischemic stroke/asymptomatic at time of exam   Pt is not a candidate for mechanical thrombectomy due to no large vessel occlusion on CTA and outside window and low concern for acute ischemic stroke/asymptomatic at time of exam     Review of Systems:    CONSTITUTIONAL: +chills and sweats attributed to menopause   EYES AND ENT: as above  NECK: No pain or stiffness  RESPIRATORY: VELASQUEZ   CARDIOVASCULAR: occasional palpitations   GASTROINTESTINAL: No melena or hematochezia  GENITOURINARY: No dysuria or hematuria  NEUROLOGICAL: +As stated in HPI above  SKIN: No itching, burning, rashes, or lesions   All other review of systems is negative unless indicated above.    Allergies:  No Known Allergies      PMHx/PSHx/Family Hx: As above, otherwise see below   Insulin dependent type 2 diabetes mellitus    Hypertension    Arthritis    CAD (coronary artery disease)        Social Hx:  former cocaine use   Lives with daughter    Medications:  MEDICATIONS  (STANDING):    MEDICATIONS  (PRN):      Vitals:  T(C): 36.7 (12-17-23 @ 17:08), Max: 37.1 (12-17-23 @ 13:07)  HR: 51 (12-17-23 @ 17:08) (51 - 59)  BP: 96/53 (12-17-23 @ 17:08) (75/48 - 105/56)  RR: 16 (12-17-23 @ 18:00) (16 - 18)  SpO2: 99% (12-17-23 @ 18:00) (98% - 99%)    Physical Examination:   General - NAD    Eyes - undoscopy not well visualize  Neurologic Exam:  Mental status - Awake, Alert, Oriented to person, place, and time. Speech fluent, repetition and naming intact. Follows simple and complex commands.    Cranial nerves - PERRL, fundoscopy not well visualized, VFF, EOMI without nystagmus, face sensation (V1-V3) intact b/l, facial strength intact without asymmetry b/l, hearing intact b/l, palate with symmetric elevation, trapezius  5/5 strength b/l, tongue midline on protrusion with full lateral movement    HINTS no preformed as patient was asymptomatic at time of exam    Motor - Normal bulk and tone throughout. No pronator drift. no leg drift  Strength testing            Deltoid      Biceps      Triceps     Wrist Extension    Wrist Flexion     Interossei         R            5                 5               5                     5                              5                        5                 5  L             5                 5               5                     5                              5                        5                 5              Hip Flexion    Hip Extension    Knee Flexion    Knee Extension    Dorsiflexion    Plantar Flexion  R              5                           5                       5                           5                            5                          5  L              5                           5                        5                           5                            5                          5    Left leg movement causes pain but is not weaker than right     Sensation - Light touch intact throughout, vibration intact in big toes, felt less on right side compared to left     DTR's -  Diffusely and symmetrically hyporeflexic, plantar is down b/l     Coordination - Finger to Nose intact b/l. No tremors appreciated. Rapid finger rapping, rapid alternatiing movements intact.     Gait and station - Normal casual gait. Romberg (-)    Labs:                        9.3    6.92  )-----------( 245      ( 17 Dec 2023 14:40 )             28.3     12-17    137  |  105  |  41<H>  ----------------------------<  176<H>  4.3   |  22  |  0.99    Ca    9.3      17 Dec 2023 14:40    TPro  6.2  /  Alb  4.0  /  TBili  0.1<L>  /  DBili  x   /  AST  12  /  ALT  18  /  AlkPhos  59  12-17    CAPILLARY BLOOD GLUCOSE      POCT Blood Glucose.: 239 mg/dL (17 Dec 2023 13:07)    LIVER FUNCTIONS - ( 17 Dec 2023 14:40 )  Alb: 4.0 g/dL / Pro: 6.2 g/dL / ALK PHOS: 59 U/L / ALT: 18 U/L / AST: 12 U/L / GGT: x             PT/INR - ( 17 Dec 2023 14:40 )   PT: 11.2 sec;   INR: 1.02 ratio         PTT - ( 17 Dec 2023 14:40 )  PTT:30.9 sec  CSF:                  Radiology:  CT Head No Cont:  (17 Dec 2023 16:30)  < from: CT Angio Neck w/ IV Cont (12.17.23 @ 16:34) >    ACC: [id number] EXAM:  CT ANGIO BRAIN (W)AW IC   ORDERED BY: BEAR GIPSON     ACC: [id number] EXAM:  CT ANGIO NECK (W)AW IC   ORDERED BY: BEAR GIPSON     ACC: [id number] EXAM:  CT BRAIN   ORDERED BY: BEAR GIPSON     PROCEDURE DATE:  12/17/2023          INTERPRETATION:  HISTORY: Dizziness.    COMPARISON: None.    TECHNIQUE: Noncontrast CT head and CT angiogram of the neck and brain was   performed after administration of intravenous contrast. MIP and 3D   reconstructions were performed.    Total of 90 cc Omnipaque 350 intravenous contrast were administered   without complication.    FINDINGS:    CT HEAD:    There is no acute intracranial mass-effect, hemorrhage, midline shift, or   abnormal extra-axial fluid collection.    Ventricles, sulci, and cisterns are normal in size for the patient's age   without hydrocephalus. Basal cisterns are patent.    Complete opacification of a hypoplastic sclerotic right mastoid. Clear   hypoplastic left mastoid.    Scattered ethmoid sinus mucosal thickening bilaterally. Remaining   visualized paranasal sinuses and mastoid air cells are clear. Calvarium   is intact.    Partial absence of the nasal septum and partial absence of the right   inferior turbinate.    CTA BRAIN:    Suboptimal arterial opacification.    INTERNAL CAROTID ARTERIES: Atheromatous irregularity and mild stenoses   along the carotid siphons bilaterally, namely the bilateral cavernous,   right clinoid/supraclinoid segments. The ICA bifurcations are   unremarkable.    ANTERIOR CEREBRAL ARTERIES: Patent without flow-limiting stenosis or   occlusion. Anterior communicating artery is unremarkable without aneurysm.    MIDDLE CEREBRAL ARTERIES: Patent without flow-limiting stenosis or   occlusion. MCA bifurcations are unremarkable without aneurysm.    POSTERIOR CEREBRAL ARTERIES: Patent without flow-limiting stenosis or   occlusion. Fetal right PCA origin with corresponding aplastic P1 segment.   The left posterior communicating artery is not well seen.    VERTEBROBASILAR SYSTEM: Patent without flow-limiting stenosis or   occlusion.    CTA NECK:    RIGHT CAROTID SYSTEM: Noncalcified and calcified plaque produces mild   stenosis along the common carotid artery (CCA). Scattered calcified   plaque along the distal ICA segment. The ICA is patent without   flow-limiting stenosis or occlusion.    LEFT CAROTID SYSTEM: Noncalcified and calcified plaque produces mild   stenosis along the CCA. Scattered calcified plaque along the carotid bulb   and ICA origin.    VERTEBRAL SYSTEM: Normal in course and caliber without flow-limiting   stenosis or occlusion. Calcified plaque along the origin of the vertebral   arteries bilaterally may contribute to focal stenoses.    AORTIC ARCH: Calcified plaque along the aortic arch.    Changes from median sternotomy and CABG are partially imaged.  Enlarged main pulmonary artery segment to 3.3 cm    IMPRESSION:    CT HEAD: No acute intracranial bleeding.    Partial absence of the nasal cavity and right inferior turbinate may be  postoperative or sequelae of infectious/inflammatory etiology, such as   Wegener's or tuberculosis, or drug abuse. Correlate with patient's   medical history and surgical history.    Opacified hypoplastic sclerotic right mastoid. Correlate for mastoiditis.    CTA BRAIN: Atheromatous irregularity and mild stenoses along the carotid   siphons bilaterally.    CTA NECK: Calcified plaque along the origin of the vertebral arteries   bilaterally may contribute to focal stenoses.    Mild CCA stenosis due to noncalcified and calcified plaque.    --- End of Report ---            JOSIE CAVANAUGH MD; Attending Radiologist  This document has been electronically signed. Dec 17 2023  5:03PM    < end of copied text >

## 2023-12-17 NOTE — H&P ADULT - PROBLEM SELECTOR PLAN 3
Pt with back pain radiating down LLE, no edema or wound on LLE  - Cw gabapentin  - Pt advised to reduce tylenol use, now 650mg q6h PRN  - Added tramadol 50mg PRN

## 2023-12-17 NOTE — ED ADULT NURSE NOTE - OBJECTIVE STATEMENT
58 year old female pt presented to the ED via triage for blurred vision and facial droop as per daughter, pt is predominantly Finnish speaking,  pt is a&ox3 ambulates with a cane ,  pt states  symptoms have been intermittent for three days. states has been eating and drinking normally. states has been feeling weak as well. denies fever, n/v/d, CP, SOB, HA, abdominal pain, urinary symptoms, fall, cough. 58 year old female pt presented to the ED via triage for blurred vision and facial droop as per daughter, pt is predominantly Hungarian speaking,  pt is a&ox3 ambulates with a cane ,  pt states  symptoms have been intermittent for three days. states has been eating and drinking normally. states has been feeling weak as well. denies fever, n/v/d, CP, SOB, HA, abdominal pain, urinary symptoms, fall, cough.

## 2023-12-17 NOTE — ED ADULT NURSE NOTE - NSFALLRISKINTERV_ED_ALL_ED
Assistance OOB with selected safe patient handling equipment if applicable/Assistance with ambulation/Communicate fall risk and risk factors to all staff, patient, and family/Monitor gait and stability/Provide patient with walking aids/Provide visual cue: yellow wristband, yellow gown, etc/Reinforce activity limits and safety measures with patient and family/Call bell, personal items and telephone in reach/Instruct patient to call for assistance before getting out of bed/chair/stretcher/Non-slip footwear applied when patient is off stretcher/[place] to call system/Physically safe environment - no spills, clutter or unnecessary equipment/Purposeful Proactive Rounding/Room/bathroom lighting operational, light cord in reach Assistance OOB with selected safe patient handling equipment if applicable/Assistance with ambulation/Communicate fall risk and risk factors to all staff, patient, and family/Monitor gait and stability/Provide patient with walking aids/Provide visual cue: yellow wristband, yellow gown, etc/Reinforce activity limits and safety measures with patient and family/Call bell, personal items and telephone in reach/Instruct patient to call for assistance before getting out of bed/chair/stretcher/Non-slip footwear applied when patient is off stretcher/[place] to call system/Physically safe environment - no spills, clutter or unnecessary equipment/Purposeful Proactive Rounding/Room/bathroom lighting operational, light cord in reach

## 2023-12-17 NOTE — H&P ADULT - PROBLEM SELECTOR PLAN 2
Hgb of 9.3, MCV normal, previous 12.1 on 10/3/23  - Unclear etiology of anemia, pt denies bleeding  - F/u anemia work up  - F/u CBC if still dropping obtain CT Abdomen/Pelvis to r/o retroperitoneal bleeding/internal bleeding

## 2023-12-17 NOTE — STROKE CODE NOTE - READ BY:
ED provider, stroke team agreed to cancel. Pt w hx vertigo. Came to ED on request of daughter today for 3days of vertigo&blurry vision, not double vision. Reports got CT scan at [facility] on Fri. ED to w/u

## 2023-12-17 NOTE — ED ADULT NURSE NOTE - NS ED PATIENT SAFETY CONCERN
Medication:   Requested Prescriptions     Pending Prescriptions Disp Refills    VITAMIN D HIGH POTENCY 25 MCG (1000 UT) CAPS [Pharmacy Med Name: VITAMIN D3 1,000 UNITS SOFT 25 MCG Capsule] 30 capsule 11     Sig: TAKE ONE TABLET ORAL ROUTE DAILY FOR BONE HEALTH        Last Filled:      Patient Phone Number: [phone] (home)     Last appt: 5/11/2022   Next appt: 8/1/2022    Last OARRS: No flowsheet data found. No

## 2023-12-17 NOTE — H&P ADULT - NSHPPHYSICALEXAM_GEN_ALL_CORE
T(C): 36.4 (12-18-23 @ 05:10), Max: 37.1 (12-17-23 @ 13:07)  HR: 60 (12-18-23 @ 05:10) (51 - 61)  BP: 123/67 (12-18-23 @ 05:10) (75/48 - 123/67)  RR: 18 (12-18-23 @ 05:10) (16 - 18)  SpO2: 99% (12-18-23 @ 05:10) (96% - 100%)    CONSTITUTIONAL: No apparent distress  EYES: PERRLA and symmetric, EOMI, No conjunctival or scleral injection, non-icteric  ENMT: Oral mucosa with moist membranes.  NECK: Supple, symmetric. No JVD.  RESP: No respiratory distress, no use of accessory muscles; CTA b/l, no WRR  CV: RRR, +S1S2, no MRG  GI: Soft, NT, ND, no rebound, no guarding  : No suprapubic tenderness. No CVA tenderness.  LYMPH: No cervical LAD or tenderness  MSK: No spinal tenderness, normal muscle strength/tone  EXTREMITIES: No pedal edema  SKIN: No rashes or ulcers noted  NEURO: A+Ox3, responsive. No tremor, sensation intact in upper and lower extremities b/l  PSYCH: Appropriate insight/judgment; mood and affect appropriate, recent/remote memory intact

## 2023-12-17 NOTE — CONSULT NOTE ADULT - ASSESSMENT
Assessment: 58 Woman PMHX  triple bypass, hypertension, hyperlipidemia, insulin-dependent diabetes, Wegener's granulomatosis, former cocaine use, nose surgery, nose trauma, "sciatica ", falls (>1 month ago) t presents with "dizziness "and visual disturbance found to have hypotension to 70s/40s. BP and symptoms improved on fluids  LKN: Dec 14 2023  NIHSS: 0  preMRS: 0   Pt is not a candidate for tenecteplase due to outside tenecteplase window and low concern for acute ischemic stroke/asymptomatic at time of exam   Pt is not a candidate for mechanical thrombectomy due to no large vessel occlusion on CTA and outside window and low concern for acute ischemic stroke/asymptomatic at time of exam     Impression: Dizziness (feels as if things will fall on her, feels as if on boat) a/w light headed sensation and darkening of binocular vision likely due to hypotension (with possible vertebral focal stenosis at their origins). Low suspicion for acute ischemic stroke    Recommendations  [] If symptoms recur can pursue MRI Brain noncon  [] Continue Aspirin  [] Clarify home cholesterol medication and continue  [] NPO unless passes dysphagia screen; swallow eval if fails  [] DVT ppx: lovenox sq daily +  SCDs  [] Rehabilitation: PT/OT consults  [] Check HgbA1C, fasting lipid panel, utox, Tele, TTE, glucose control, B12, Folate, TSH, Free T4  [] Orthostatic blood pressure   [] Anemia, hypotension, acidosis, sterile pyuria per primary  [] Continue outpatient pain management for what is described as "sciatica" and likely reflects a lumbar radiculopathy     Case not discussed with stroke fellow. Case and decision to cancel code stroke had previously been discussed with Stroke Fellow Dr. Giovanny Farris  under the supervision of Neurology Attending Dr. Haile Starr. Patient to be seen and staffed December 18th please await attestation.

## 2023-12-17 NOTE — ED ADULT NURSE NOTE - PATIENT'S SEXUAL ORIENTATION
Additional Prescription Justification Text: If there is any interruption in treatment exceeding 5 days please see Decay and Dose Adjustment Calculation and complete treatment under Prescription 2, 3 or 4 as appropriate. Heterosexual

## 2023-12-17 NOTE — H&P ADULT - NSICDXPASTMEDICALHX_GEN_ALL_CORE_FT
PAST MEDICAL HISTORY:  Arthritis     Asthma     CAD (coronary artery disease)     Hypertension     Insulin dependent type 2 diabetes mellitus

## 2023-12-17 NOTE — H&P ADULT - PROBLEM SELECTOR PLAN 1
- Pt with hypotension in ED down to 75 systolic  - Improved with fluids, likely cause of dizziness/presyncope, symptoms improved with BP - Pt with hypotension in ED down to 75 systolic  - Improved with fluids, likely cause of dizziness/presyncope, symptoms improved with BP  - Hold home lisinopril for now  - F/u orthostatics

## 2023-12-17 NOTE — H&P ADULT - ASSESSMENT
58F Irish-speaking w/ 3V CABG (2023), HTN, HLD, IDDM, Wegener's granulomatosis, cocaine use s/p nose surgery?, Chronic back pain w/radiculopathy presents with dizziness and presyncope 58F Italian-speaking w/ 3V CABG (2023), HTN, HLD, IDDM, Wegener's granulomatosis, cocaine use s/p nose surgery?, Chronic back pain w/radiculopathy presents with dizziness and presyncope

## 2023-12-17 NOTE — ED PROVIDER NOTE - OBJECTIVE STATEMENT
58yF Vietnamese speaking, translated by daughter, Nicole IDDM, HLD, HTN, asthma, arthritis, CAD, presents to the ER for dizziness, visual disturbance x three days. as per patient, symptoms have been intermittent for three days. states has been eating and drinking normally. states has been feeling weak as well. denies fever, n/v/d, CP, SOB, HA, abdominal pain, urinary symptoms, fall, cough. 58yF Botswanan speaking, translated by daughter, Nicole IDDM, HLD, HTN, asthma, arthritis, CAD, presents to the ER for dizziness, visual disturbance x three days. as per patient, symptoms have been intermittent for three days. states has been eating and drinking normally. states has been feeling weak as well. denies fever, n/v/d, CP, SOB, HA, abdominal pain, urinary symptoms, fall, cough.

## 2023-12-18 ENCOUNTER — TRANSCRIPTION ENCOUNTER (OUTPATIENT)
Age: 58
End: 2023-12-18

## 2023-12-18 VITALS
TEMPERATURE: 98 F | RESPIRATION RATE: 18 BRPM | SYSTOLIC BLOOD PRESSURE: 146 MMHG | OXYGEN SATURATION: 95 % | HEART RATE: 69 BPM | DIASTOLIC BLOOD PRESSURE: 78 MMHG

## 2023-12-18 DIAGNOSIS — H40.9 UNSPECIFIED GLAUCOMA: ICD-10-CM

## 2023-12-18 DIAGNOSIS — I95.9 HYPOTENSION, UNSPECIFIED: ICD-10-CM

## 2023-12-18 DIAGNOSIS — J45.909 UNSPECIFIED ASTHMA, UNCOMPLICATED: ICD-10-CM

## 2023-12-18 DIAGNOSIS — M54.9 DORSALGIA, UNSPECIFIED: ICD-10-CM

## 2023-12-18 DIAGNOSIS — D64.9 ANEMIA, UNSPECIFIED: ICD-10-CM

## 2023-12-18 DIAGNOSIS — E78.5 HYPERLIPIDEMIA, UNSPECIFIED: ICD-10-CM

## 2023-12-18 DIAGNOSIS — E11.9 TYPE 2 DIABETES MELLITUS WITHOUT COMPLICATIONS: ICD-10-CM

## 2023-12-18 LAB
A1C WITH ESTIMATED AVERAGE GLUCOSE RESULT: 7.5 % — HIGH (ref 4–5.6)
A1C WITH ESTIMATED AVERAGE GLUCOSE RESULT: 7.5 % — HIGH (ref 4–5.6)
ALBUMIN SERPL ELPH-MCNC: 4.2 G/DL — SIGNIFICANT CHANGE UP (ref 3.3–5)
ALBUMIN SERPL ELPH-MCNC: 4.2 G/DL — SIGNIFICANT CHANGE UP (ref 3.3–5)
ALP SERPL-CCNC: 66 U/L — SIGNIFICANT CHANGE UP (ref 40–120)
ALP SERPL-CCNC: 66 U/L — SIGNIFICANT CHANGE UP (ref 40–120)
ALT FLD-CCNC: 19 U/L — SIGNIFICANT CHANGE UP (ref 10–45)
ALT FLD-CCNC: 19 U/L — SIGNIFICANT CHANGE UP (ref 10–45)
ANION GAP SERPL CALC-SCNC: 12 MMOL/L — SIGNIFICANT CHANGE UP (ref 5–17)
ANION GAP SERPL CALC-SCNC: 12 MMOL/L — SIGNIFICANT CHANGE UP (ref 5–17)
AST SERPL-CCNC: 11 U/L — SIGNIFICANT CHANGE UP (ref 10–40)
AST SERPL-CCNC: 11 U/L — SIGNIFICANT CHANGE UP (ref 10–40)
BILIRUB SERPL-MCNC: 0.2 MG/DL — SIGNIFICANT CHANGE UP (ref 0.2–1.2)
BILIRUB SERPL-MCNC: 0.2 MG/DL — SIGNIFICANT CHANGE UP (ref 0.2–1.2)
BUN SERPL-MCNC: 30 MG/DL — HIGH (ref 7–23)
BUN SERPL-MCNC: 30 MG/DL — HIGH (ref 7–23)
CALCIUM SERPL-MCNC: 9 MG/DL — SIGNIFICANT CHANGE UP (ref 8.4–10.5)
CALCIUM SERPL-MCNC: 9 MG/DL — SIGNIFICANT CHANGE UP (ref 8.4–10.5)
CHLORIDE SERPL-SCNC: 110 MMOL/L — HIGH (ref 96–108)
CHLORIDE SERPL-SCNC: 110 MMOL/L — HIGH (ref 96–108)
CHOLEST SERPL-MCNC: 157 MG/DL — SIGNIFICANT CHANGE UP
CHOLEST SERPL-MCNC: 157 MG/DL — SIGNIFICANT CHANGE UP
CO2 SERPL-SCNC: 18 MMOL/L — LOW (ref 22–31)
CO2 SERPL-SCNC: 18 MMOL/L — LOW (ref 22–31)
CREAT SERPL-MCNC: 0.68 MG/DL — SIGNIFICANT CHANGE UP (ref 0.5–1.3)
CREAT SERPL-MCNC: 0.68 MG/DL — SIGNIFICANT CHANGE UP (ref 0.5–1.3)
CRP SERPL-MCNC: <3 MG/L — SIGNIFICANT CHANGE UP (ref 0–4)
CRP SERPL-MCNC: <3 MG/L — SIGNIFICANT CHANGE UP (ref 0–4)
CULTURE RESULTS: SIGNIFICANT CHANGE UP
CULTURE RESULTS: SIGNIFICANT CHANGE UP
EGFR: 102 ML/MIN/1.73M2 — SIGNIFICANT CHANGE UP
EGFR: 102 ML/MIN/1.73M2 — SIGNIFICANT CHANGE UP
ERYTHROCYTE [SEDIMENTATION RATE] IN BLOOD: 19 MM/HR — SIGNIFICANT CHANGE UP (ref 0–20)
ERYTHROCYTE [SEDIMENTATION RATE] IN BLOOD: 19 MM/HR — SIGNIFICANT CHANGE UP (ref 0–20)
ESTIMATED AVERAGE GLUCOSE: 169 MG/DL — HIGH (ref 68–114)
ESTIMATED AVERAGE GLUCOSE: 169 MG/DL — HIGH (ref 68–114)
FERRITIN SERPL-MCNC: 93 NG/ML — SIGNIFICANT CHANGE UP (ref 13–330)
FERRITIN SERPL-MCNC: 93 NG/ML — SIGNIFICANT CHANGE UP (ref 13–330)
FOLATE SERPL-MCNC: 11.7 NG/ML — SIGNIFICANT CHANGE UP
FOLATE SERPL-MCNC: 11.7 NG/ML — SIGNIFICANT CHANGE UP
GLUCOSE BLDC GLUCOMTR-MCNC: 123 MG/DL — HIGH (ref 70–99)
GLUCOSE BLDC GLUCOMTR-MCNC: 123 MG/DL — HIGH (ref 70–99)
GLUCOSE BLDC GLUCOMTR-MCNC: 97 MG/DL — SIGNIFICANT CHANGE UP (ref 70–99)
GLUCOSE BLDC GLUCOMTR-MCNC: 97 MG/DL — SIGNIFICANT CHANGE UP (ref 70–99)
GLUCOSE SERPL-MCNC: 88 MG/DL — SIGNIFICANT CHANGE UP (ref 70–99)
GLUCOSE SERPL-MCNC: 88 MG/DL — SIGNIFICANT CHANGE UP (ref 70–99)
HAPTOGLOB SERPL-MCNC: 124 MG/DL — SIGNIFICANT CHANGE UP (ref 34–200)
HAPTOGLOB SERPL-MCNC: 124 MG/DL — SIGNIFICANT CHANGE UP (ref 34–200)
HCT VFR BLD CALC: 30.8 % — LOW (ref 34.5–45)
HCT VFR BLD CALC: 30.8 % — LOW (ref 34.5–45)
HDLC SERPL-MCNC: 40 MG/DL — LOW
HDLC SERPL-MCNC: 40 MG/DL — LOW
HGB BLD-MCNC: 10.6 G/DL — LOW (ref 11.5–15.5)
HGB BLD-MCNC: 10.6 G/DL — LOW (ref 11.5–15.5)
IRON SATN MFR SERPL: 21 % — SIGNIFICANT CHANGE UP (ref 14–50)
IRON SATN MFR SERPL: 21 % — SIGNIFICANT CHANGE UP (ref 14–50)
IRON SATN MFR SERPL: 88 UG/DL — SIGNIFICANT CHANGE UP (ref 30–160)
IRON SATN MFR SERPL: 88 UG/DL — SIGNIFICANT CHANGE UP (ref 30–160)
LIPID PNL WITH DIRECT LDL SERPL: 89 MG/DL — SIGNIFICANT CHANGE UP
LIPID PNL WITH DIRECT LDL SERPL: 89 MG/DL — SIGNIFICANT CHANGE UP
MCHC RBC-ENTMCNC: 31.5 PG — SIGNIFICANT CHANGE UP (ref 27–34)
MCHC RBC-ENTMCNC: 31.5 PG — SIGNIFICANT CHANGE UP (ref 27–34)
MCHC RBC-ENTMCNC: 34.4 GM/DL — SIGNIFICANT CHANGE UP (ref 32–36)
MCHC RBC-ENTMCNC: 34.4 GM/DL — SIGNIFICANT CHANGE UP (ref 32–36)
MCV RBC AUTO: 91.4 FL — SIGNIFICANT CHANGE UP (ref 80–100)
MCV RBC AUTO: 91.4 FL — SIGNIFICANT CHANGE UP (ref 80–100)
NON HDL CHOLESTEROL: 117 MG/DL — SIGNIFICANT CHANGE UP
NON HDL CHOLESTEROL: 117 MG/DL — SIGNIFICANT CHANGE UP
NRBC # BLD: 0 /100 WBCS — SIGNIFICANT CHANGE UP (ref 0–0)
NRBC # BLD: 0 /100 WBCS — SIGNIFICANT CHANGE UP (ref 0–0)
PLATELET # BLD AUTO: 257 K/UL — SIGNIFICANT CHANGE UP (ref 150–400)
PLATELET # BLD AUTO: 257 K/UL — SIGNIFICANT CHANGE UP (ref 150–400)
POTASSIUM SERPL-MCNC: 4.4 MMOL/L — SIGNIFICANT CHANGE UP (ref 3.5–5.3)
POTASSIUM SERPL-MCNC: 4.4 MMOL/L — SIGNIFICANT CHANGE UP (ref 3.5–5.3)
POTASSIUM SERPL-SCNC: 4.4 MMOL/L — SIGNIFICANT CHANGE UP (ref 3.5–5.3)
POTASSIUM SERPL-SCNC: 4.4 MMOL/L — SIGNIFICANT CHANGE UP (ref 3.5–5.3)
PROT SERPL-MCNC: 6.6 G/DL — SIGNIFICANT CHANGE UP (ref 6–8.3)
PROT SERPL-MCNC: 6.6 G/DL — SIGNIFICANT CHANGE UP (ref 6–8.3)
RBC # BLD: 3.37 M/UL — LOW (ref 3.8–5.2)
RBC # FLD: 12.7 % — SIGNIFICANT CHANGE UP (ref 10.3–14.5)
RBC # FLD: 12.7 % — SIGNIFICANT CHANGE UP (ref 10.3–14.5)
RETICS #: 58.6 K/UL — SIGNIFICANT CHANGE UP (ref 25–125)
RETICS #: 58.6 K/UL — SIGNIFICANT CHANGE UP (ref 25–125)
RETICS/RBC NFR: 1.7 % — SIGNIFICANT CHANGE UP (ref 0.5–2.5)
RETICS/RBC NFR: 1.7 % — SIGNIFICANT CHANGE UP (ref 0.5–2.5)
SODIUM SERPL-SCNC: 140 MMOL/L — SIGNIFICANT CHANGE UP (ref 135–145)
SODIUM SERPL-SCNC: 140 MMOL/L — SIGNIFICANT CHANGE UP (ref 135–145)
SPECIMEN SOURCE: SIGNIFICANT CHANGE UP
SPECIMEN SOURCE: SIGNIFICANT CHANGE UP
TIBC SERPL-MCNC: 425 UG/DL — SIGNIFICANT CHANGE UP (ref 220–430)
TIBC SERPL-MCNC: 425 UG/DL — SIGNIFICANT CHANGE UP (ref 220–430)
TRIGL SERPL-MCNC: 157 MG/DL — HIGH
TRIGL SERPL-MCNC: 157 MG/DL — HIGH
UIBC SERPL-MCNC: 338 UG/DL — SIGNIFICANT CHANGE UP (ref 110–370)
UIBC SERPL-MCNC: 338 UG/DL — SIGNIFICANT CHANGE UP (ref 110–370)
VIT B12 SERPL-MCNC: 932 PG/ML — SIGNIFICANT CHANGE UP (ref 232–1245)
VIT B12 SERPL-MCNC: 932 PG/ML — SIGNIFICANT CHANGE UP (ref 232–1245)
WBC # BLD: 6.51 K/UL — SIGNIFICANT CHANGE UP (ref 3.8–10.5)
WBC # BLD: 6.51 K/UL — SIGNIFICANT CHANGE UP (ref 3.8–10.5)
WBC # FLD AUTO: 6.51 K/UL — SIGNIFICANT CHANGE UP (ref 3.8–10.5)
WBC # FLD AUTO: 6.51 K/UL — SIGNIFICANT CHANGE UP (ref 3.8–10.5)

## 2023-12-18 PROCEDURE — 99223 1ST HOSP IP/OBS HIGH 75: CPT

## 2023-12-18 RX ORDER — DEXTROSE 50 % IN WATER 50 %
25 SYRINGE (ML) INTRAVENOUS ONCE
Refills: 0 | Status: DISCONTINUED | OUTPATIENT
Start: 2023-12-18 | End: 2023-12-19

## 2023-12-18 RX ORDER — SODIUM CHLORIDE 9 MG/ML
1000 INJECTION, SOLUTION INTRAVENOUS
Refills: 0 | Status: DISCONTINUED | OUTPATIENT
Start: 2023-12-18 | End: 2023-12-19

## 2023-12-18 RX ORDER — ALBUTEROL 90 UG/1
2 AEROSOL, METERED ORAL EVERY 6 HOURS
Refills: 0 | Status: DISCONTINUED | OUTPATIENT
Start: 2023-12-18 | End: 2023-12-19

## 2023-12-18 RX ORDER — BRIMONIDINE TARTRATE 2 MG/MG
1 SOLUTION/ DROPS OPHTHALMIC DAILY
Refills: 0 | Status: DISCONTINUED | OUTPATIENT
Start: 2023-12-18 | End: 2023-12-19

## 2023-12-18 RX ORDER — GLUCAGON INJECTION, SOLUTION 0.5 MG/.1ML
1 INJECTION, SOLUTION SUBCUTANEOUS ONCE
Refills: 0 | Status: DISCONTINUED | OUTPATIENT
Start: 2023-12-18 | End: 2023-12-19

## 2023-12-18 RX ORDER — ASPIRIN/CALCIUM CARB/MAGNESIUM 324 MG
81 TABLET ORAL DAILY
Refills: 0 | Status: DISCONTINUED | OUTPATIENT
Start: 2023-12-18 | End: 2023-12-19

## 2023-12-18 RX ORDER — BRIMONIDINE TARTRATE, TIMOLOL MALEATE 2; 5 MG/ML; MG/ML
1 SOLUTION/ DROPS OPHTHALMIC
Refills: 0 | DISCHARGE

## 2023-12-18 RX ORDER — DEXTROSE 50 % IN WATER 50 %
12.5 SYRINGE (ML) INTRAVENOUS ONCE
Refills: 0 | Status: DISCONTINUED | OUTPATIENT
Start: 2023-12-18 | End: 2023-12-19

## 2023-12-18 RX ORDER — INSULIN LISPRO 100/ML
VIAL (ML) SUBCUTANEOUS
Refills: 0 | Status: DISCONTINUED | OUTPATIENT
Start: 2023-12-18 | End: 2023-12-19

## 2023-12-18 RX ORDER — DEXTROSE 50 % IN WATER 50 %
15 SYRINGE (ML) INTRAVENOUS ONCE
Refills: 0 | Status: DISCONTINUED | OUTPATIENT
Start: 2023-12-18 | End: 2023-12-19

## 2023-12-18 RX ORDER — BRIMONIDINE TARTRATE 2 MG/MG
1 SOLUTION/ DROPS OPHTHALMIC
Refills: 0 | DISCHARGE

## 2023-12-18 RX ORDER — INSULIN LISPRO 100/ML
6 VIAL (ML) SUBCUTANEOUS
Refills: 0 | Status: DISCONTINUED | OUTPATIENT
Start: 2023-12-18 | End: 2023-12-19

## 2023-12-18 RX ORDER — TRAMADOL HYDROCHLORIDE 50 MG/1
50 TABLET ORAL THREE TIMES A DAY
Refills: 0 | Status: DISCONTINUED | OUTPATIENT
Start: 2023-12-18 | End: 2023-12-19

## 2023-12-18 RX ORDER — MONTELUKAST 4 MG/1
10 TABLET, CHEWABLE ORAL AT BEDTIME
Refills: 0 | Status: DISCONTINUED | OUTPATIENT
Start: 2023-12-18 | End: 2023-12-19

## 2023-12-18 RX ORDER — ATORVASTATIN CALCIUM 80 MG/1
80 TABLET, FILM COATED ORAL AT BEDTIME
Refills: 0 | Status: DISCONTINUED | OUTPATIENT
Start: 2023-12-18 | End: 2023-12-19

## 2023-12-18 RX ORDER — LISINOPRIL 2.5 MG/1
1 TABLET ORAL
Qty: 30 | Refills: 0
Start: 2023-12-18 | End: 2024-01-16

## 2023-12-18 RX ORDER — INSULIN GLARGINE 100 [IU]/ML
20 INJECTION, SOLUTION SUBCUTANEOUS AT BEDTIME
Refills: 0 | Status: DISCONTINUED | OUTPATIENT
Start: 2023-12-18 | End: 2023-12-19

## 2023-12-18 RX ORDER — LISINOPRIL 2.5 MG/1
1 TABLET ORAL
Refills: 0 | DISCHARGE

## 2023-12-18 RX ORDER — GABAPENTIN 400 MG/1
100 CAPSULE ORAL THREE TIMES A DAY
Refills: 0 | Status: DISCONTINUED | OUTPATIENT
Start: 2023-12-18 | End: 2023-12-19

## 2023-12-18 RX ADMIN — Medication 81 MILLIGRAM(S): at 14:05

## 2023-12-18 RX ADMIN — TRAMADOL HYDROCHLORIDE 50 MILLIGRAM(S): 50 TABLET ORAL at 04:47

## 2023-12-18 RX ADMIN — Medication 6 UNIT(S): at 12:02

## 2023-12-18 RX ADMIN — GABAPENTIN 100 MILLIGRAM(S): 400 CAPSULE ORAL at 14:05

## 2023-12-18 RX ADMIN — Medication 3 MILLIGRAM(S): at 01:59

## 2023-12-18 RX ADMIN — GABAPENTIN 100 MILLIGRAM(S): 400 CAPSULE ORAL at 06:06

## 2023-12-18 NOTE — PROGRESS NOTE ADULT - ASSESSMENT
{\rtf1\sougyz87133\ansi\nosthul0160\ftnbj\uc1\deff0  {\fonttbl{\f0 \fnil Segoe UI;}{\f1 \fnil \fcharset0 Segoe UI;}{\f2 \fnil Times New [place];}}  {\colortbl ;\krr941\miiqu994\jyse140 ;\red0\green0\blue0 ;\red0\green0\vjsy886 ;\red0\green0\blue0 ;}  {\stylesheet{\f0\fs20 Normal;}{\cs1 Default Paragraph Font;}{\cs2\f0\fs16 Line Number;}{\cs3\f2\fs24\ul\cf3 Hyperlink;}}  {\*\revtbl{Unknown;}}  \dnkvha07164\abjrix94048\gekgv2706\golrh1242\xhnwv4481\xyowc1996\zkhzrai291\pzycwqz164\nogrowautofit\uzggpj468\formshade\nofeaturethrottle1\dntblnsbdb\fet4\aendnotes\aftnnrlc\pgbrdrhead\pgbrdrfoot  \sectd\awxqde34275\sjiuvg64329\guttersxn0\gnroftwk1545\tlabuhlw6477\wlqzpoak9059\cihnipkt6831\gftelqc541\xjemaao660\sbkpage\pgncont\pgndec  \plain\plain\f0\fs24\ql\plain\f0\fs24\plain\f0\fs20\pwaq9282\hich\f0\dbch\f0\loch\f0\fs20 58F Slovenian-speaking w/ 3V CABG (2023), HTN, HLD, IDDM, Wegener's granulomatosis, cocaine use s/p nose surgery?, Chronic back pain w/radiculopathy presents with   dizziness and presyncope  \par  \par  \par  \plain\f1\fs20\ihox7856\hich\f1\dbch\f1\loch\f1\cf2\fs20\b\ul{\field{\*\fldinst HYPERLINK [id number] }{\fldrslt Problem/Plan - 1:}}\plain\f0\fs20\nzxt4475\hich\f0\dbch\f0\loch\f0\fs20\ql\par  \'b7  {\*\bkmkstart qf27492032035}{\*\bkmkend do21365792509}Problem: {\*\bkmkstart it60819892274}{\*\bkmkend vc73047602360}Hypotension. \par  \'b7  {\*\bkmkstart kr27282971968}{\*\bkmkend ik89532451197}Plan: {\*\bkmkstart jy84571295071}{\*\bkmkend bc87574713575}- Pt with hypotension in ED down to 75 systolic\par  - Improved with fluids, likely cause of dizziness/presyncope, symptoms improved with BP\par  - Hold home lisinopril for now\par  - F/u orthostatics.\plain\f1\fs20\gkdx8070\hich\f1\dbch\f1\loch\f1\cf2\fs20\strike\plain\f0\fs20\yixv7300\hich\f0\dbch\f0\loch\f0\fs20 - Echo\par  - Cardiology follow\par  \par  \plain\f1\fs20\ecfj9170\hich\f1\dbch\f1\loch\f1\cf2\fs20\b\ul{\field{\*\fldinst HYPERLINK [id number] }{\fldrslt Problem/Plan - 2:}}\plain\f0\fs20\vdjw4338\hich\f0\dbch\f0\loch\f0\fs20\ql\par  \'b7  {\*\bkmkstart wh16875149865}{\*\bkmkend gi95431113976}Problem: {\*\bkmkstart wc93540544013}{\*\bkmkend ew78447509713}Anemia. \par  \'b7  {\*\bkmkstart gf03181540015}{\*\bkmkend hi83176984120}Plan: {\*\bkmkstart qk62556883769}{\*\bkmkend wm17599002361}Hgb of 9.3, MCV normal, previous 12.1 on 10/3/23\par  - Unclear etiology of anemia, pt denies bleeding\par  - F/u anemia work up\par  - F/u CBC if still dropping obtain CT Abdomen/Pelvis to r/o retroperitoneal bleeding/internal bleeding.\par  \par  \plain\f1\fs20\grsz9374\hich\f1\dbch\f1\loch\f1\cf2\fs20\b\ul{\field{\*\fldinst HYPERLINK [id number] }{\fldrslt Problem/Plan - 3:}}\plain\f0\fs20\szew8988\hich\f0\dbch\f0\loch\f0\fs20\ql\par  \'b7  {\*\bkmkstart ey26357074762}{\*\bkmkend oe03681341289}Problem: {\*\bkmkstart sj48342959003}{\*\bkmkend ok57739849508}Chronic back pain. \par  \'b7  {\*\bkmkstart cx38632462986}{\*\bkmkend ga65095108568}Plan: {\*\bkmkstart nx56006997914}{\*\bkmkend zy04916930679}Pt with back pain radiating down LLE, no edema or wound on LLE\par  - Cw gabapentin\par  - Pt advised to reduce tylenol use, now 650mg q6h PRN\par  - Added tramadol 50mg PRN.\par  \par  \plain\f1\fs20\dknr3749\hich\f1\dbch\f1\loch\f1\cf2\fs20\b\ul{\field{\*\fldinst HYPERLINK [id number] }{\fldrslt Problem/Plan - 4:}}\plain\f0\fs20\uoie4937\hich\f0\dbch\f0\loch\f0\fs20\ql\par  \'b7  {\*\bkmkstart lx77819004162}{\*\bkmkend lp31298458395}Problem: {\*\bkmkstart ir97233455526}{\*\bkmkend de78592944062}DM (diabetes mellitus). \par  \'b7  {\*\bkmkstart ku64182849519}{\*\bkmkend oa45198739088}Plan: {\*\bkmkstart ox84181824151}{\*\bkmkend su79074872171}- Cw lantus 20u\par  - Cw lispro 6u TID\par  - Cw gabapentin 100mg TID\par  - Mod ISS\par  - F/u POCT, A1c.\par  \par  \plain\f1\fs20\hfbo0678\hich\f1\dbch\f1\loch\f1\cf2\fs20\b\ul{\field{\*\fldinst HYPERLINK [id number] }{\fldrslt Problem/Plan - 5:}}\plain\f0\fs20\uhmv1521\hich\f0\dbch\f0\loch\f0\fs20\ql\par  \'b7  {\*\bkmkstart sn65811089335}{\*\bkmkend tu09456450530}Problem: {\*\bkmkstart gc00212267645}{\*\bkmkend sm65445266226}Glaucoma. \par  \'b7  {\*\bkmkstart gv81724985460}{\*\bkmkend tm83703250388}Plan: {\*\bkmkstart kv95464445726}{\*\bkmkend gt08417315707}- Cw brimonidine.\par  \par  \plain\f1\fs20\pixg4422\hich\f1\dbch\f1\loch\f1\cf2\fs20\b\ul{\field{\*\fldinst HYPERLINK [id number] }{\fldrslt Problem/Plan - 6:}}\plain\f0\fs20\vxww9698\hich\f0\dbch\f0\loch\f0\fs20\ql\par  \'b7  {\*\bkmkstart lm79427739697}{\*\bkmkend bc89137740169}Problem: {\*\bkmkstart je94215421216}{\*\bkmkend io50330092819}HLD (hyperlipidemia). \par  \'b7  {\*\bkmkstart sb38701295258}{\*\bkmkend xc74278949801}Plan: {\*\bkmkstart gg57684676561}{\*\bkmkend hh90054259473}- Cw aspirin, statin, do not have fenofibrate.\par  \par  \plain\f1\fs20\nqcu9175\hich\f1\dbch\f1\loch\f1\cf2\fs20\b\ul{\field{\*\fldinst HYPERLINK [id number] }{\fldrslt Problem/Plan - 7:}}\plain\f0\fs20\titk8080\hich\f0\dbch\f0\loch\f0\fs20\ql\par  \'b7  {\*\bkmkstart yh47485655851}{\*\bkmkend dz65640645459}Problem: {\*\bkmkstart mc65265110285}{\*\bkmkend qc94182526307}Asthma. \par  \'b7  {\*\bkmkstart mq88982002899}{\*\bkmkend xo94998894031}Plan: {\*\bkmkstart or72935017658}{\*\bkmkend zf65419123806}- Cw montelukast and albuterol PRN.\par  \par  DVT prophylaxis\par  \par  Steven Gould MD phone [phone] \par  }   {\rtf1\nrxygx93669\ansi\uartjby0780\ftnbj\uc1\deff0  {\fonttbl{\f0 \fnil Segoe UI;}{\f1 \fnil \fcharset0 Segoe UI;}{\f2 \fnil Times New [place];}}  {\colortbl ;\nwu083\btilo146\vokp504 ;\red0\green0\blue0 ;\red0\green0\avdz689 ;\red0\green0\blue0 ;}  {\stylesheet{\f0\fs20 Normal;}{\cs1 Default Paragraph Font;}{\cs2\f0\fs16 Line Number;}{\cs3\f2\fs24\ul\cf3 Hyperlink;}}  {\*\revtbl{Unknown;}}  \alwjqb07408\zfrawa00151\vatai2440\knuuk4307\lddje3652\xznld2760\gadhhft382\yutmlwp003\nogrowautofit\prjmuj758\formshade\nofeaturethrottle1\dntblnsbdb\fet4\aendnotes\aftnnrlc\pgbrdrhead\pgbrdrfoot  \sectd\btkzto03961\bcskfm84208\guttersxn0\sqhavnkq3871\vkxfyfdh3515\cdgslmwa1396\mweqbunq4578\jezwoaj298\dvestsn439\sbkpage\pgncont\pgndec  \plain\plain\f0\fs24\ql\plain\f0\fs24\plain\f0\fs20\jums5873\hich\f0\dbch\f0\loch\f0\fs20 58F Bol[place]ian-speaking w/ 3V CABG (2023), HTN, HLD, IDDM, Wegener's granulomatosis, cocaine use s/p nose surgery?, Chronic back pain w/radiculopathy presents with   dizziness and presyncope  \par  \par  \par  \plain\f1\fs20\htww2409\hich\f1\dbch\f1\loch\f1\cf2\fs20\b\ul{\field{\*\fldinst HYPERLINK [id number] }{\fldrslt Problem/Plan - 1:}}\plain\f0\fs20\nfnm4648\hich\f0\dbch\f0\loch\f0\fs20\ql\par  \'b7  {\*\bkmkstart rm91649980746}{\*\bkmkend ch14860429703}Problem: {\*\bkmkstart ra97210909770}{\*\bkmkend cl55478668651}Hypotension. \par  \'b7  {\*\bkmkstart ms19629643893}{\*\bkmkend zm86013022079}Plan: {\*\bkmkstart qv0919672}{\*\bkmkend av31057087779}- Pt with hypotension in ED down to 75 systolic\par  - Improved with fluids, likely cause of dizziness/presyncope, symptoms improved with BP\par  - Hold home lisinopril for now\par  - F/u orthostatics.\plain\f1\fs20\ptbs6965\hich\f1\dbch\f1\loch\f1\cf2\fs20\strike\plain\f0\fs20\bdak5413\hich\f0\dbch\f0\loch\f0\fs20 - Echo\par  - Cardiology follow\par  \par  \plain\f1\fs20\ryzt3725\hich\f1\dbch\f1\loch\f1\cf2\fs20\b\ul{\field{\*\fldinst HYPERLINK [id number] }{\fldrslt Problem/Plan - 2:}}\plain\f0\fs20\sgze7732\hich\f0\dbch\f0\loch\f0\fs20\ql\par  \'b7  {\*\bkmkstart kh73189434289}{\*\bkmkend di43558350814}Problem: {\*\bkmkstart cn52239846118}{\*\bkmkend ru31865555284}Anemia. \par  \'b7  {\*\bkmkstart ob26445642398}{\*\bkmkend eg18007551858}Plan: {\*\bkmkstart xi68592287886}{\*\bkmkend wb28246068932}Hgb of 9.3, MCV normal, previous 12.1 on 10/3/23\par  - Unclear etiology of anemia, pt denies bleeding\par  - F/u anemia work up\par  - F/u CBC if still dropping obtain CT Abdomen/Pelvis to r/o retroperitoneal bleeding/internal bleeding.\par  \par  \plain\f1\fs20\emuz6560\hich\f1\dbch\f1\loch\f1\cf2\fs20\b\ul{\field{\*\fldinst HYPERLINK [id number] }{\fldrslt Problem/Plan - 3:}}\plain\f0\fs20\lvrm6922\hich\f0\dbch\f0\loch\f0\fs20\ql\par  \'b7  {\*\bkmkstart ww34821411162}{\*\bkmkend dt29099855319}Problem: {\*\bkmkstart fr78836988441}{\*\bkmkend og34786815926}Chronic back pain. \par  \'b7  {\*\bkmkstart bn15846877147}{\*\bkmkend is40011201748}Plan: {\*\bkmkstart cc95721717270}{\*\bkmkend us72172655872}Pt with back pain radiating down LLE, no edema or wound on LLE\par  - Cw gabapentin\par  - Pt advised to reduce tylenol use, now 650mg q6h PRN\par  - Added tramadol 50mg PRN.\par  \par  \plain\f1\fs20\dyck5927\hich\f1\dbch\f1\loch\f1\cf2\fs20\b\ul{\field{\*\fldinst HYPERLINK [id number] }{\fldrslt Problem/Plan - 4:}}\plain\f0\fs20\bikf8291\hich\f0\dbch\f0\loch\f0\fs20\ql\par  \'b7  {\*\bkmkstart dm84870638920}{\*\bkmkend ti19500345498}Problem: {\*\bkmkstart la51012779522}{\*\bkmkend fd18189544242}DM (diabetes mellitus). \par  \'b7  {\*\bkmkstart bo41180125550}{\*\bkmkend pz09771735225}Plan: {\*\bkmkstart bw09544530793}{\*\bkmkend dn14798539647}- Cw lantus 20u\par  - Cw lispro 6u TID\par  - Cw gabapentin 100mg TID\par  - Mod ISS\par  - F/u POCT, A1c.\par  \par  \plain\f1\fs20\wvom9657\hich\f1\dbch\f1\loch\f1\cf2\fs20\b\ul{\field{\*\fldinst HYPERLINK [id number] }{\fldrslt Problem/Plan - 5:}}\plain\f0\fs20\fmsd5009\hich\f0\dbch\f0\loch\f0\fs20\ql\par  \'b7  {\*\bkmkstart hc58441440245}{\*\bkmkend mc03905992379}Problem: {\*\bkmkstart hr43586441518}{\*\bkmkend zl83189766154}Glaucoma. \par  \'b7  {\*\bkmkstart kk67497793094}{\*\bkmkend un46457506518}Plan: {\*\bkmkstart yb74362155662}{\*\bkmkend tq93298064904}- Cw brimonidine.\par  \par  \plain\f1\fs20\qdkh0596\hich\f1\dbch\f1\loch\f1\cf2\fs20\b\ul{\field{\*\fldinst HYPERLINK [id number] }{\fldrslt Problem/Plan - 6:}}\plain\f0\fs20\fzem7079\hich\f0\dbch\f0\loch\f0\fs20\ql\par  \'b7  {\*\bkmkstart dt89847728081}{\*\bkmkend ed90095658788}Problem: {\*\bkmkstart fj74480227121}{\*\bkmkend dz42193377529}HLD (hyperlipidemia). \par  \'b7  {\*\bkmkstart xt79163453391}{\*\bkmkend rp48102117103}Plan: {\*\bkmkstart gn08880224935}{\*\bkmkend oo76949342310}- Cw aspirin, statin, do not have fenofibrate.\par  \par  \plain\f1\fs20\shxk9793\hich\f1\dbch\f1\loch\f1\cf2\fs20\b\ul{\field{\*\fldinst HYPERLINK [id number] }{\fldrslt Problem/Plan - 7:}}\plain\f0\fs20\jsse0715\hich\f0\dbch\f0\loch\f0\fs20\ql\par  \'b7  {\*\bkmkstart gn94417254700}{\*\bkmkend wn43004511109}Problem: {\*\bkmkstart hc72820734284}{\*\bkmkend le80674165327}Asthma. \par  \'b7  {\*\bkmkstart ul49418798723}{\*\bkmkend yi46519386506}Plan: {\*\bkmkstart tq71729015892}{\*\bkmkend er60637096641}- Cw montelukast and albuterol PRN.\par  \par  DVT prophylaxis\par  \par  Steven Gould MD phone [phone] \par  }

## 2023-12-18 NOTE — DISCHARGE NOTE PROVIDER - CARE PROVIDER_API CALL
Marty Koch  Cardiovascular Disease  [address] [place] [address]  Phone: [phone]  Fax: [phone]  Follow Up Time:    Marty Koch  Cardiovascular Disease  [address] [place] [address]  Phone: [phone]  Fax: [phone]  Follow Up Time:

## 2023-12-18 NOTE — PHYSICAL THERAPY INITIAL EVALUATION ADULT - PERTINENT HX OF CURRENT PROBLEM, REHAB EVAL
Pt is a 58F Sinhala-speaking admitted to [facility] on 12/17/23 w/ 3V CABG (2023), HTN, HLD, IDDM, Wegener's granulomatosis, cocaine use s/p nose surgery?, Chronic back pain w/radiculopathy presents with dizziness and presyncope, described as blacking out". Pt reports has passed out in the past and this felt similar but she did not lose consciousness. Dizziness described as feeling like the "room was gonna fall on her". She had similar symptoms 3 days ago that resolved on their own with rest. She also reports significant back pain for which she has had multiple shots in her back for and reports she has been taking 650mg of tylenol every 2-3 hours for her pain. pain radiates down LLE and causes significant LLE pain. Pt was admitted months ago for LLE pain and minor wound that has since healed. She has had multiple falls in the past but did not fall as a result of this dizziness. Symptoms have resolved now that BP has improved other than her back and LLE pain. Pt denies fever, chills, chest pain, SOB, abd pain, nausea, vomiting, constipation, dysuria. Hospital course: CTH: No acute intracranial bleeding. Partial absence of the nasal cavity and right inferior turbinate may be postoperative or sequelae of infectious/inflammatory etiology, such as Wegener's or tuberculosis, or drug abuse. Correlate with patient's medical history and surgical history. Opacified hypoplastic sclerotic right mastoid. Correlate for mastoiditis. CTA Brain: Atheromatous irregularity and mild stenoses along the carotid siphons bilaterally. CTA Neck: Calcified plaque along the origin of the vertebral arteries bilaterally may contribute to focal stenoses. Mild CCA stenosis due to noncalcified and calcified plaque. CXR (-), 12/17 LKN: Dec 14 2023, NIHSS: 0, Pt is not a candidate for mechanical thrombectomy due to no large vessel occlusion on CTA and outside window and low concern for acute ischemic stroke/asymptomatic at time of exam; +Dizziness/ Visual Disturbance- s/p Code Stroke in ED - c/w ASA/ Statin, Hypotension-  no fever, wbc wnl -s/p 2.5L IVF with improvement / cont  IVF; +cardiac stable, no e/o acs; Per neuro, 3 days prior to presentation, she was feeling "dizzy "which she described as feeling like everything was going to fall on her, she also felt like she was on a boat, this was associated with lightheaded sensation and darkening of her complete vision for up to 3 minutes.  She refused hospitalization 3 days ago and improved with oral intake and rest, but now presents again for recurrence of symptoms.   She has "sciatica" for which she has had at least 2 epidurals, she believes her prior falls are due to left leg weakness, her fall started in 2015. [facility]  ED course significant for code stroke which was canceled given last known normal greater than 3 days ago, hypotension to the 70s over 40s, improved status post 1.5 L of normal saline and 1 L of IV acetaminophen.  She noted symptomatic improvement in that time. Notes worsening of LLE pain and subjective weakness. no saddle anesthesia or incontinence. On gabapentin 100mg QID, +Dizziness (feels as if things will fall on her, feels as if on boat) a/w light headed sensation and darkening of binocular vision likely due to hypotension (with possible vertebral focal stenosis at their origins). Low suspicion for acute ischemic stroke,  DVT ppx: lovenox sq daily +  SCDs Pt is a 58F Chinese-speaking admitted to [facility] on 12/17/23 w/ 3V CABG (2023), HTN, HLD, IDDM, Wegener's granulomatosis, cocaine use s/p nose surgery?, Chronic back pain w/radiculopathy presents with dizziness and presyncope, described as blacking out". Pt reports has passed out in the past and this felt similar but she did not lose consciousness. Dizziness described as feeling like the "room was gonna fall on her". She had similar symptoms 3 days ago that resolved on their own with rest. She also reports significant back pain for which she has had multiple shots in her back for and reports she has been taking 650mg of tylenol every 2-3 hours for her pain. pain radiates down LLE and causes significant LLE pain. Pt was admitted months ago for LLE pain and minor wound that has since healed. She has had multiple falls in the past but did not fall as a result of this dizziness. Symptoms have resolved now that BP has improved other than her back and LLE pain. Pt denies fever, chills, chest pain, SOB, abd pain, nausea, vomiting, constipation, dysuria. Hospital course: CTH: No acute intracranial bleeding. Partial absence of the nasal cavity and right inferior turbinate may be postoperative or sequelae of infectious/inflammatory etiology, such as Wegener's or tuberculosis, or drug abuse. Correlate with patient's medical history and surgical history. Opacified hypoplastic sclerotic right mastoid. Correlate for mastoiditis. CTA Brain: Atheromatous irregularity and mild stenoses along the carotid siphons bilaterally. CTA Neck: Calcified plaque along the origin of the vertebral arteries bilaterally may contribute to focal stenoses. Mild CCA stenosis due to noncalcified and calcified plaque. CXR (-), 12/17 LKN: Dec 14 2023, NIHSS: 0, Pt is not a candidate for mechanical thrombectomy due to no large vessel occlusion on CTA and outside window and low concern for acute ischemic stroke/asymptomatic at time of exam; +Dizziness/ Visual Disturbance- s/p Code Stroke in ED - c/w ASA/ Statin, Hypotension-  no fever, wbc wnl -s/p 2.5L IVF with improvement / cont  IVF; +cardiac stable, no e/o acs; Per neuro, 3 days prior to presentation, she was feeling "dizzy "which she described as feeling like everything was going to fall on her, she also felt like she was on a boat, this was associated with lightheaded sensation and darkening of her complete vision for up to 3 minutes.  She refused hospitalization 3 days ago and improved with oral intake and rest, but now presents again for recurrence of symptoms.   She has "sciatica" for which she has had at least 2 epidurals, she believes her prior falls are due to left leg weakness, her fall [facility] in 2015[facility]  ED course significant for code stroke which was canceled given last known normal greater than 3 days ago, hypotension to the 70s over 40s, improved status post 1.5 L of normal saline and 1 L of IV acetaminophen.  She noted symptomatic improvement in that time. Notes worsening of LLE pain and subjective weakness. no saddle anesthesia or incontinence. On gabapentin 100mg QID, +Dizziness (feels as if things will fall on her, feels as if on boat) a/w light headed sensation and darkening of binocular vision likely due to hypotension (with possible vertebral focal stenosis at their origins). Low suspicion for acute ischemic stroke,  DVT ppx: lovenox sq daily +  SCDs

## 2023-12-18 NOTE — OCCUPATIONAL THERAPY INITIAL EVALUATION ADULT - GENERAL OBSERVATIONS, REHAB EVAL
Pt recv'd sitting up right on stretcher in ED, Daughter at b/s able to translate Tajik for pt Pt recv'd sitting up right on stretcher in ED, Daughter at b/s able to translate Amharic for pt

## 2023-12-18 NOTE — CHART NOTE - NSCHARTNOTEFT_GEN_A_CORE
AMA    Called by: Dr. Thakur      Plan of Care/Events Leading up to AMA: Patient requesting to leave AMA will follow up with Cardiology as an outpt      Explanation of Leaving AMA:  Throughout our interactions the patient has demonstrated concrete thinking/reasoning, has maintained an orderly/reasonable conversation, appears to have intact insight/judgment/reason, is A+Ox4, and appears of a sound mind and therefore in our opinion, does not have an indication to presume lack of capacity. All medical information and discussions were communicated (in laymen's terms), including the teams' clinical concerns.  The patient verbalized an understanding of our worries completing a teach back.  We’ve communicated to the patient that their hospital course is incomplete and there is the potential that conditions that are threatening to life have not been ruled out. Our discussions included the potential outcomes of leaving AMA, including worsening of their condition, becoming permanently disabled/in pain/critically ill, or death.  Despite these efforts, we were unable to convince the patient to stay. The patient is refusing any further care and is leaving against medical advice. Patient was provided with follow up care. Patient was informed that they may call 911 or return to the hospital via the ED at any time. Patient going directly to Dr. Gomez's office.     Time patient left AMA: 1615

## 2023-12-18 NOTE — DISCHARGE NOTE PROVIDER - NSDCMRMEDTOKEN_GEN_ALL_CORE_FT
Albuterol (Eqv-ProAir HFA) 90 mcg/inh inhalation aerosol: 2 puff(s) inhaled every 6 hours as needed  Alphagan P 0.1% ophthalmic solution: 1 drop(s) in each affected eye 2 times a day  aspirin 81 mg oral delayed release tablet: 1 tab(s) orally 2 times a day  atorvastatin 80 mg oral tablet: 1 tab(s) orally once a day  brimonidine-timolol 0.2%-0.5% ophthalmic solution: 1 drop(s) in each eye 2 times a day [see internal note]  fenofibrate 48 mg oral tablet: 1 tab(s) orally once a day  gabapentin 100 mg oral capsule: 1 cap(s) orally 3 times a day as needed  Lantus 100 units/mL subcutaneous solution: 20 unit(s) subcutaneous once a day (at bedtime)  montelukast 10 mg oral tablet: 1 tab(s) orally once a day (at bedtime)  NovoLOG 100 units/mL subcutaneous solution: 6 unit(s) subcutaneous 3 times a day (before meals)  Tylenol 8 Hour 650 mg oral tablet, extended release: 3 tab(s) orally every 2 hours as needed  Zestril 5 mg oral tablet: 1 tab(s) orally once a day

## 2023-12-18 NOTE — PROVIDER CONTACT NOTE (OTHER) - ASSESSMENT
willow is alert and oriented x4; NSR on tele monitor. denies any chest pain, sob or headache.  patient states she does not want to stay in the hospital anymore and would rather go outpatient for furhter care. slight agitated and restless. daughter at bedside. educated patient on importance of diagnosis and care. patient still insisting on leaving

## 2023-12-18 NOTE — PROVIDER CONTACT NOTE (CRITICAL VALUE NOTIFICATION) - BACKGROUND
Pt is admitted for dizziness, visual disturbance. PMH of asthma, CAD, arthritis, hypertension, DM type 2

## 2023-12-18 NOTE — DISCHARGE NOTE NURSING/CASE MANAGEMENT/SOCIAL WORK - NSDCPEFALRISK_GEN_ALL_CORE
For information on Fall & Injury Prevention, visit: [email or web address] OR  [email or web address] OR  https://www.cdc.gov/steadi/patient.html For information on Fall & Injury Prevention, visit: [email or web address] OR  [email or web address] OR  https://www.cdc.gov/steadi/patient.html

## 2023-12-18 NOTE — DISCHARGE NOTE PROVIDER - HOSPITAL COURSE
HPI:  58F Sudanese-speaking w/ 3V CABG (2023), HTN, HLD, IDDM, Wegener's granulomatosis, cocaine use s/p nose surgery?, Chronic back pain w/radiculopathy presents with dizziness and presyncope, described as blacking out". Patient reports has passed out in the past and this felt similar but she did not lose consciousness. Dizziness described as feeling like the "room was gonna fall on her". She had similar symptoms 3 days ago that resolved on their own with rest. She also reports significant back pain for which she has had multiple shots in her back for and reports she has been taking 650mg of tylenol every 2-3 hours for her pain. pain radiates down LLE and causes significant LLE pain. Patient was admitted months ago for LLE pain and minor wound that has since healed. She has had multiple falls in the past but did not fall as a result of this dizziness. Symptoms have resolved now that BP has improved other than her back and LLE pain. Patient denies fever, chills, chest pain, SOB, abd pain, nausea, vomiting, constipation, dysuria. (17 Dec 2023 23:48)    Hospital Course:  58F Sudanese-speaking w/ 3V CABG (2023), HTN, HLD, IDDM, Wegener's granulomatosis, cocaine use s/p nose surgery?, Chronic back pain w/radiculopathy presents with dizziness and presyncope in the setting of hypotension in ED down to 75 systolic, now improved with fluids, likely cause of dizziness/presyncope, symptoms improved with BP and home lisinopril held for now. Pt seen by Neurology and Cardiology. No further neuro eval warranted. Patient seen by cards and recommends inpt TTE, patient refusing to stay for inpt workup and has decided to leave AMA. Patient demonstrated understanding of risk vs benefit of leaving AMA and will follow up with Dr. Gomez in 2-5 days.     Important Medication Changes and Reason:  Lisinopril decreased to 5mg from 20mg     Active or Pending Issues Requiring Follow-up:  Follow up with Dr. Gomez in 3- 5 days     Advanced Directives:   [ X] Full code  [ ] DNR  [ ] Hospice    Discharge Diagnoses:  Hypotension/Presyncope - r/o stroke        HPI:  58F Sao Tomean-speaking w/ 3V CABG (2023), HTN, HLD, IDDM, Wegener's granulomatosis, cocaine use s/p nose surgery?, Chronic back pain w/radiculopathy presents with dizziness and presyncope, described as blacking out". Patient reports has passed out in the past and this felt similar but she did not lose consciousness. Dizziness described as feeling like the "room was gonna fall on her". She had similar symptoms 3 days ago that resolved on their own with rest. She also reports significant back pain for which she has had multiple shots in her back for and reports she has been taking 650mg of tylenol every 2-3 hours for her pain. pain radiates down LLE and causes significant LLE pain. Patient was admitted months ago for LLE pain and minor wound that has since healed. She has had multiple falls in the past but did not fall as a result of this dizziness. Symptoms have resolved now that BP has improved other than her back and LLE pain. Patient denies fever, chills, chest pain, SOB, abd pain, nausea, vomiting, constipation, dysuria. (17 Dec 2023 23:48)    Hospital Course:  58F Sao Tomean-speaking w/ 3V CABG (2023), HTN, HLD, IDDM, Wegener's granulomatosis, cocaine use s/p nose surgery?, Chronic back pain w/radiculopathy presents with dizziness and presyncope in the setting of hypotension in ED down to 75 systolic, now improved with fluids, likely cause of dizziness/presyncope, symptoms improved with BP and home lisinopril held for now. Pt seen by Neurology and Cardiology. No further neuro eval warranted. Patient seen by cards and recommends inpt TTE, patient refusing to stay for inpt workup and has decided to leave AMA. Patient demonstrated understanding of risk vs benefit of leaving AMA and will follow up with Dr. Gomez in 2-5 days.     Important Medication Changes and Reason:  Lisinopril decreased to 5mg from 20mg     Active or Pending Issues Requiring Follow-up:  Follow up with Dr. Gomez in 3- 5 days     Advanced Directives:   [ X] Full code  [ ] DNR  [ ] Hospice    Discharge Diagnoses:  Hypotension/Presyncope - r/o stroke

## 2023-12-18 NOTE — OCCUPATIONAL THERAPY INITIAL EVALUATION ADULT - ADDITIONAL COMMENTS
Pt reports that she resides in a  , 12 steps to enter with HR, one floor set up. +tub in bathroom. Prior to this she ambulated with a cane, no other equipment in home, She reports that she is Independent in adl's and has a HHA 4hours a day, 7 days a week to assist with medications  Daughter reports that there are times when Pt is alone in house throughout the jva[place] Pt reports that she resides in a  , 12 steps to enter with HR, one floor set up. +tub in bathroom. Prior to this she ambulated with a cane, no other equipment in home, She reports that she is Independent in adl's and has a HHA 4hours a day, 7 days a week to assist with medications  Daughter reports that there are times when Pt is alone in house throughout the jav[place]

## 2023-12-18 NOTE — PROVIDER CONTACT NOTE (OTHER) - SITUATION
patient wants to leave AMA. patient states she does not want to stay in the hospital anymore and would rather go outpatient for [facility] patient wants to leave AMA. patient states she does not want to stay in the hospital anymore and would rather go outpatient for [facility]

## 2023-12-18 NOTE — PHYSICAL THERAPY INITIAL EVALUATION ADULT - GENERAL OBSERVATIONS, REHAB EVAL
Pt a/w dizziness and presyncope, code stroke in ED (-). Pt received sitting up in stretcher (EDH 66L), +IVL, A&OX4, Danish speaking, dtr at bedside to translate. Pt a/w dizziness and presyncope, code stroke in ED (-). Pt received sitting up in stretcher (EDH 66L), +IVL, A&OX4, Sami speaking, dtr at bedside to translate.

## 2023-12-18 NOTE — PHYSICAL THERAPY INITIAL EVALUATION ADULT - PLANNED THERAPY INTERVENTIONS, PT EVAL
pt is functionally independent, will DC from PT services, assist from family as needed, remain on amb aide walking program

## 2023-12-18 NOTE — OCCUPATIONAL THERAPY INITIAL EVALUATION ADULT - PERTINENT HX OF CURRENT PROBLEM, REHAB EVAL
Pt is a 58F Mongolian-speaking admitted to [facility] on 12/17/23 w/ 3V CABG (2023), HTN, HLD, IDDM, Wegener's granulomatosis, cocaine use s/p nose surgery?, Chronic back pain w/radiculopathy presents with dizziness and presyncope, described as blacking out". Pt reports has passed out in the past and this felt similar but she did not lose consciousness. Dizziness described as feeling like the "room was gonna fall on her". She had similar symptoms 3 days ago that resolved on their own with rest. She also reports significant back pain for which she has had multiple shots in her back for and reports she has been taking 650mg of Tylenol every 2-3 hours for her pain. pain radiates down LLE and causes significant LLE pain. Pt was admitted months ago for LLE pain and minor wound that has since healed. She has had multiple falls in the past but did not fall as a result of this dizziness. Symptoms have resolved now that BP has improved other than her back and LLE pain. Pt denies fever, chills, chest pain, SOB, abd pain, nausea, vomiting, constipation, dysuria. Hospital course: CTH: No acute intracranial bleeding. Partial absence of the nasal cavity and right inferior turbinate may be postoperative or sequelae of infectious/inflammatory etiology, such as Wegener's or tuberculosis, or drug abuse. Correlate with patient's medical history and surgical history. Opacified hypoplastic sclerotic right mastoid. Correlate for mastoiditis. CTA Brain: Atheromatous irregularity and mild stenoses along the carotid siphons bilaterally. CTA Neck: Calcified plaque along the origin of the vertebral arteries bilaterally may contribute to focal stenoses. Mild CCA stenosis due to noncalcified and calcified plaque. CXR (-), 12/17 LKN: Dec 14 2023, NIHSS: 0, Pt is not a candidate for mechanical thrombectomy due to no large vessel occlusion on CTA and outside window and low concern for acute ischemic stroke/asymptomatic at time of exam; +Dizziness/ Visual Disturbance- s/p Code Stroke in ED - c/w ASA/ Statin, Hypotension-  no fever, wbc wnl -s/p 2.5L IVF with improvement / cont  IVF; +cardiac stable, no e/o acs; Per neuro, 3 days prior to presentation, she was feeling "dizzy "which she described as feeling like everything was going to fall on her, she also felt like she was on a boat, this was associated with lightheaded sensation and darkening of her complete vision for up to 3 minutes.  She refused hospitalization 3 days ago and improved with oral intake and rest, but now presents again for recurrence of symptoms.   She has "sciatica" for which she has had at least 2 epidurals, she believes her prior falls are due to left leg weakness, her fall started in 2015. Last 2 falls in [facility] 2023.  ED course significant for code stroke which was canceled given last known normal greater than 3 days ago, hypotension to the 70s over 40s, improved status post 1.5 L of normal saline and 1 L of IV acetaminophen.  She noted symptomatic improvement in that time. Notes worsening of LLE pain and subjective weakness. no saddle anesthesia or incontinence. On gabapentin 100mg QID, +Dizziness (feels as if things will fall on her, feels as if on boat) a/w light headed sensation and darkening of binocular vision likely due to hypotension (with possible vertebral focal stenosis at their origins). Low suspicion for acute ischemic stroke,  DVT ppx: lovenox sq daily +  SCDs Pt is a 58F Greenlandic-speaking admitted to [facility] on 12/17/23 w/ 3V CABG (2023), HTN, HLD, IDDM, Wegener's granulomatosis, cocaine use s/p nose surgery?, Chronic back pain w/radiculopathy presents with dizziness and presyncope, described as blacking out". Pt reports has passed out in the past and this felt similar but she did not lose consciousness. Dizziness described as feeling like the "room was gonna fall on her". She had similar symptoms 3 days ago that resolved on their own with rest. She also reports significant back pain for which she has had multiple shots in her back for and reports she has been taking 650mg of Tylenol every 2-3 hours for her pain. pain radiates down LLE and causes significant LLE pain. Pt was admitted months ago for LLE pain and minor wound that has since healed. She has had multiple falls in the past but did not fall as a result of this dizziness. Symptoms have resolved now that BP has improved other than her back and LLE pain. Pt denies fever, chills, chest pain, SOB, abd pain, nausea, vomiting, constipation, dysuria. Hospital course: CTH: No acute intracranial bleeding. Partial absence of the nasal cavity and right inferior turbinate may be postoperative or sequelae of infectious/inflammatory etiology, such as Wegener's or tuberculosis, or drug abuse. Correlate with patient's medical history and surgical history. Opacified hypoplastic sclerotic right mastoid. Correlate for mastoiditis. CTA Brain: Atheromatous irregularity and mild stenoses along the carotid siphons bilaterally. CTA Neck: Calcified plaque along the origin of the vertebral arteries bilaterally may contribute to focal stenoses. Mild CCA stenosis due to noncalcified and calcified plaque. CXR (-), 12/17 LKN: Dec 14 2023, NIHSS: 0, Pt is not a candidate for mechanical thrombectomy due to no large vessel occlusion on CTA and outside window and low concern for acute ischemic stroke/asymptomatic at time of exam; +Dizziness/ Visual Disturbance- s/p Code Stroke in ED - c/w ASA/ Statin, Hypotension-  no fever, wbc wnl -s/p 2.5L IVF with improvement / cont  IVF; +cardiac stable, no e/o acs; Per neuro, 3 days prior to presentation, she was feeling "dizzy "which she described as feeling like everything was going to fall on her, she also felt like she was on a boat, this was associated with lightheaded sensation and darkening of her complete vision for up to 3 minutes.  She refused hospitalization 3 days ago and improved with oral intake and rest, but now presents again for recurrence of symptoms.   She has "sciatica" for which she has had at least 2 epidurals, she believes her prior falls are due to left leg weakness, her fall started in 2015. Last 2 falls in [facility] 2023.  ED course significant for code stroke which was canceled given last known normal greater than 3 days ago, hypotension to the 70s over 40s, improved status post 1.5 L of normal saline and 1 L of IV acetaminophen.  She noted symptomatic improvement in that time. Notes worsening of LLE pain and subjective weakness. no saddle anesthesia or incontinence. On gabapentin 100mg QID, +Dizziness (feels as if things will fall on her, feels as if on boat) a/w light headed sensation and darkening of binocular vision likely due to hypotension (with possible vertebral focal stenosis at their origins). Low suspicion for acute ischemic stroke,  DVT ppx: lovenox sq daily +  SCDs

## 2023-12-18 NOTE — PROGRESS NOTE ADULT - SUBJECTIVE AND OBJECTIVE BOX
Admitted overnight by hospitalist service     Patient is a 58y old  Female who presents with a chief complaint of Blurry vision, dizziness (18 Dec 2023 15:45)      SUBJECTIVE / OVERNIGHT EVENTS:  Review of Systems  chest pain no  palpitations no  sob no  nausea no  headache no    MEDICATIONS  (STANDING):  aspirin enteric coated 81 milliGRAM(s) Oral daily  atorvastatin 80 milliGRAM(s) Oral at bedtime  brimonidine 0.2% Ophthalmic Solution 1 Drop(s) Both EYES daily  dextrose 5%. 1000 milliLiter(s) (100 mL/Hr) IV Continuous <Continuous>  dextrose 5%. 1000 milliLiter(s) (50 mL/Hr) IV Continuous <Continuous>  dextrose 50% Injectable 25 Gram(s) IV Push once  dextrose 50% Injectable 25 Gram(s) IV Push once  dextrose 50% Injectable 12.5 Gram(s) IV Push once  gabapentin 100 milliGRAM(s) Oral three times a day  glucagon  Injectable 1 milliGRAM(s) IntraMuscular once  insulin glargine Injectable (LANTUS) 20 Unit(s) SubCutaneous at bedtime  insulin lispro (ADMELOG) corrective regimen sliding scale   SubCutaneous three times a day before meals  insulin lispro Injectable (ADMELOG) 6 Unit(s) SubCutaneous three times a day before meals  lactated ringers. 1000 milliLiter(s) (100 mL/Hr) IV Continuous <Continuous>  montelukast 10 milliGRAM(s) Oral at bedtime    MEDICATIONS  (PRN):  acetaminophen     Tablet .. 650 milliGRAM(s) Oral every 6 hours PRN Temp greater or equal to 38C (100.4F), Mild Pain (1 - 3)  albuterol    90 MICROgram(s) HFA Inhaler 2 Puff(s) Inhalation every 6 hours PRN Shortness of Breath and/or Wheezing  dextrose Oral Gel 15 Gram(s) Oral once PRN Blood Glucose LESS THAN 70 milliGRAM(s)/deciliter  melatonin 3 milliGRAM(s) Oral at bedtime PRN Insomnia  traMADol 50 milliGRAM(s) Oral three times a day PRN Severe Pain (7 - 10)      Vital Signs Last 24 Hrs  T(C): 36.8 (18 Dec 2023 16:23), Max: 37.1 (18 Dec 2023 11:23)  T(F): 98.3 (18 Dec 2023 16:23), Max: 98.7 (18 Dec 2023 11:23)  HR: 69 (18 Dec 2023 16:23) (53 - 81)  BP: 146/78 (18 Dec 2023 16:23) (96/45 - 146/78)  BP(mean): 62 (17 Dec 2023 22:00) (62 - 62)  RR: 18 (18 Dec 2023 16:23) (16 - 18)  SpO2: 95% (18 Dec 2023 16:23) (95% - 100%)    Parameters below as of 18 Dec 2023 16:23  Patient On (Oxygen Delivery Method): room air        PHYSICAL EXAM:  GENERAL: NAD  HEAD:  Atraumatic, Normocephalic  EYES: EOMI, PERRLA, conjunctiva and sclera clear  NECK: Supple, No JVD  CHEST/LUNG: Clear to auscultation bilaterally; No wheeze  HEART: Regular rate and rhythm; No murmurs, rubs, or gallops  ABDOMEN: Soft, Nontender, Nondistended; Bowel sounds present  EXTREMITIES:  2+ Peripheral Pulses, No clubbing, cyanosis, or edema  PSYCH: AAOx3  NEUROLOGY: non-focal  SKIN: No rashes or lesions    LABS:                        10.6   6.51  )-----------( 257      ( 18 Dec 2023 09:12 )             30.8     12-18    140  |  110<H>  |  30<H>  ----------------------------<  88  4.4   |  18<L>  |  0.68    Ca    9.0      18 Dec 2023 07:17    TPro  6.6  /  Alb  4.2  /  TBili  0.2  /  DBili  x   /  AST  11  /  ALT  19  /  AlkPhos  66  12-18    PT/INR - ( 17 Dec 2023 14:40 )   PT: 11.2 sec;   INR: 1.02 ratio         PTT - ( 17 Dec 2023 14:40 )  PTT:30.9 sec      Urinalysis Basic - ( 18 Dec 2023 07:17 )    Color: x / Appearance: x / SG: x / pH: x  Gluc: 88 mg/dL / Ketone: x  / Bili: x / Urobili: x   Blood: x / Protein: x / Nitrite: x   Leuk Esterase: x / RBC: x / WBC x   Sq Epi: x / Non Sq Epi: x / Bacteria: x        Culture - Urine (collected 17 Dec 2023 16:19)  Source: Clean Catch Clean Catch (Midstream)  Final Report (18 Dec 2023 19:20):    <10,000 CFU/mL Normal Urogenital Susan        RADIOLOGY & ADDITIONAL TESTS:    Imaging Personally Reviewed:  < from: CT Angio Neck w/ IV Cont (12.17.23 @ 16:34) >  IMPRESSION:    CT HEAD: No acute intracranial bleeding.    Partial absence of the nasal cavity and right inferior turbinate may be  postoperative or sequelae of infectious/inflammatory etiology, such as   Wegener's or tuberculosis, or drug abuse. Correlate with patient's   medical history and surgical history.    Opacified hypoplastic sclerotic right mastoid. Correlate for mastoiditis.    CTA BRAIN: Atheromatous irregularity and mild stenoses along the carotid   siphons bilaterally.    CTA NECK: Calcified plaque along the origin of the vertebral arteries   bilaterally may contribute to focal stenoses.    Mild CCA stenosis due to noncalcified and calcified plaque.    < end of copied text >    Consultant(s) Notes Reviewed:      Care Discussed with Consultants/Other Providers:

## 2023-12-18 NOTE — DISCHARGE NOTE NURSING/CASE MANAGEMENT/SOCIAL WORK - PATIENT PORTAL LINK FT
You can access the FollowMyHealth Patient Portal offered by [facility] by registering at the following website: [email or web address]. By joining [employer]’s FollowMyHealth portal, you will also be able to view your health information using other applications (apps) compatible with our system. You can access the FollowMyHealth Patient Portal offered by [facility] by registering at the following website: [email or web address]. By joining [employer]’s FollowMyHealth portal, you will also be able to view your health information using other applications (apps) compatible with our system.

## 2023-12-18 NOTE — CONSULT NOTE ADULT - SUBJECTIVE AND OBJECTIVE BOX
CHIEF COMPLAINT: dizziness    HISTORY OF PRESENT ILLNESS:  58F Russian-speaking w/ 3V CABG (2023), HTN, HLD, IDDM, Wegener's granulomatosis, cocaine use s/p nose surgery?, Chronic back pain w/radiculopathy presents with dizziness and presyncope, described as blacking out". Patient reports has passed out in the past and this felt similar but she did not lose consciousness. Dizziness described as feeling like the "room was gonna fall on her". She had similar symptoms 3 days ago that resolved on their own with rest. She also reports significant back pain for which she has had multiple shots in her back for and reports she has been taking 650mg of tylenol every 2-3 hours for her pain. pain radiates down LLE and causes significant LLE pain. Patient was admitted months ago for LLE pain and minor wound that has since healed. She has had multiple falls in the past but did not fall as a result of this dizziness. Symptoms have resolved now that BP has improved other than her back and LLE pain. Patient denies fever, chills, chest pain, SOB, abd pain, nausea, vomiting, constipation, dysuria.        Allergies    No Known Allergies    Intolerances    	    MEDICATIONS:  aspirin enteric coated 81 milliGRAM(s) Oral daily      albuterol    90 MICROgram(s) HFA Inhaler 2 Puff(s) Inhalation every 6 hours PRN  montelukast 10 milliGRAM(s) Oral at bedtime    acetaminophen     Tablet .. 650 milliGRAM(s) Oral every 6 hours PRN  gabapentin 100 milliGRAM(s) Oral three times a day  melatonin 3 milliGRAM(s) Oral at bedtime PRN  traMADol 50 milliGRAM(s) Oral three times a day PRN      atorvastatin 80 milliGRAM(s) Oral at bedtime  dextrose 50% Injectable 25 Gram(s) IV Push once  dextrose 50% Injectable 12.5 Gram(s) IV Push once  dextrose 50% Injectable 25 Gram(s) IV Push once  dextrose Oral Gel 15 Gram(s) Oral once PRN  glucagon  Injectable 1 milliGRAM(s) IntraMuscular once  insulin glargine Injectable (LANTUS) 20 Unit(s) SubCutaneous at bedtime  insulin lispro (ADMELOG) corrective regimen sliding scale   SubCutaneous three times a day before meals  insulin lispro Injectable (ADMELOG) 6 Unit(s) SubCutaneous three times a day before meals    brimonidine 0.2% Ophthalmic Solution 1 Drop(s) Both EYES daily  dextrose 5%. 1000 milliLiter(s) IV Continuous <Continuous>  dextrose 5%. 1000 milliLiter(s) IV Continuous <Continuous>  lactated ringers. 1000 milliLiter(s) IV Continuous <Continuous>      PAST MEDICAL & SURGICAL HISTORY:  Insulin dependent type 2 diabetes mellitus      Hypertension      Arthritis      CAD (coronary artery disease)      Asthma          FAMILY HISTORY:      SOCIAL HISTORY:    non smoker. indep in adl      REVIEW OF SYSTEMS:  See HPI, otherwise complete 10 point review of systems negative    [ ] All others negative	      PHYSICAL EXAM:  T(C): 36.4 (12-18-23 @ 05:10), Max: 37.1 (12-17-23 @ 13:07)  HR: 60 (12-18-23 @ 05:10) (51 - 61)  BP: 123/67 (12-18-23 @ 05:10) (75/48 - 123/67)  RR: 18 (12-18-23 @ 05:10) (16 - 18)  SpO2: 99% (12-18-23 @ 05:10) (96% - 100%)  Wt(kg): --  I&O's Summary      Appearance: No Acute Distress	  HEENT:  Normal oral mucosa, PERRL, EOMI	  Cardiovascular: Normal S1 S2, No JVD, No murmurs/rubs/gallops  Respiratory: Lungs clear to auscultation bilaterally  Gastrointestinal:  Soft, Non-tender, + BS	  Skin: No rashes, No ecchymoses, No cyanosis	  Neurologic: Non-focal  Extremities: No clubbing, cyanosis or edema  Vascular: Peripheral pulses palpable 2+ bilaterally  Psychiatry: A & O x 3, Mood & affect appropriate    Laboratory Data:	 	    CBC Full  -  ( 17 Dec 2023 14:40 )  WBC Count : 6.92 K/uL  Hemoglobin : 9.3 g/dL  Hematocrit : 28.3 %  Platelet Count - Automated : 245 K/uL  Mean Cell Volume : 93.1 fl  Mean Cell Hemoglobin : 30.6 pg  Mean Cell Hemoglobin Concentration : 32.9 gm/dL  Auto Neutrophil # : 3.09 K/uL  Auto Lymphocyte # : 3.30 K/uL  Auto Monocyte # : 0.36 K/uL  Auto Eosinophil # : 0.11 K/uL  Auto Basophil # : 0.03 K/uL  Auto Neutrophil % : 44.7 %  Auto Lymphocyte % : 47.7 %  Auto Monocyte % : 5.2 %  Auto Eosinophil % : 1.6 %  Auto Basophil % : 0.4 %    12-18    140  |  110<H>  |  30<H>  ----------------------------<  88  4.4   |  18<L>  |  0.68  12-17    137  |  105  |  41<H>  ----------------------------<  176<H>  4.3   |  22  |  0.99    Ca    9.0      18 Dec 2023 07:17  Ca    9.3      17 Dec 2023 14:40    TPro  6.6  /  Alb  4.2  /  TBili  0.2  /  DBili  x   /  AST  11  /  ALT  19  /  AlkPhos  66  12-18  TPro  6.2  /  Alb  4.0  /  TBili  0.1<L>  /  DBili  x   /  AST  12  /  ALT  18  /  AlkPhos  59  12-17      proBNP:   Lipid Profile:   HgA1c:   TSH: Thyroid Stimulating Hormone, Serum: 0.92 uIU/mL (12-17 @ 14:40)        CARDIAC MARKERS:            Interpretation of Telemetry: 	    ECG:  	  RADIOLOGY:  OTHER: 	    PREVIOUS DIAGNOSTIC TESTING:    [ ] Echocardiogram:  [ ] Catheterization:  [ ] Stress Test:  	      ACC: [id number] EXAM:  CT ANGIO BRAIN (W)AW IC   ORDERED BY: BEAR GIPSON     ACC: [id number] EXAM:  CT ANGIO NECK (W)AW IC   ORDERED BY: BEAR GIPSON     ACC: [id number] EXAM:  CT BRAIN   ORDERED BY: BEAR GIPSON     PROCEDURE DATE:  12/17/2023          INTERPRETATION:  HISTORY: Dizziness.    COMPARISON: None.    TECHNIQUE: Noncontrast CT head and CT angiogram of the neck and brain was   performed after administration of intravenous contrast. MIP and 3D   reconstructions were performed.    Total of 90 cc Omnipaque 350 intravenous contrast were administered   without complication.    FINDINGS:    CT HEAD:    There is no acute intracranial mass-effect, hemorrhage, midline shift, or   abnormal extra-axial fluid collection.    Ventricles, sulci, and cisterns are normal in size for the patient's age   without hydrocephalus. Basal cisterns are patent.    Complete opacification of a hypoplastic sclerotic right mastoid. Clear   hypoplastic left mastoid.    Scattered ethmoid sinus mucosal thickening bilaterally. Remaining   visualized paranasal sinuses and mastoid air cells are clear. Calvarium   is intact.    Partial absence of the nasal septum and partial absence of the right   inferior turbinate.    CTA BRAIN:    Suboptimal arterial opacification.    INTERNAL CAROTID ARTERIES: Atheromatous irregularity and mild stenoses   along the carotid siphons bilaterally, namely the bilateral cavernous,   right clinoid/supraclinoid segments. The ICA bifurcations are   unremarkable.    ANTERIOR CEREBRAL ARTERIES: Patent without flow-limiting stenosis or   occlusion. Anterior communicating artery is unremarkable without aneurysm.    MIDDLE CEREBRAL ARTERIES: Patent without flow-limiting stenosis or   occlusion. MCA bifurcations are unremarkable without aneurysm.    POSTERIOR CEREBRAL ARTERIES: Patent without flow-limiting stenosis or   occlusion. Fetal right PCA origin with corresponding aplastic P1 segment.   The left posterior communicating artery is not well seen.    VERTEBROBASILAR SYSTEM: Patent without flow-limiting stenosis or   occlusion.    CTA NECK:    RIGHT CAROTID SYSTEM: Noncalcified and calcified plaque produces mild   stenosis along the common carotid artery (CCA). Scattered calcified   plaque along the distal ICA segment. The ICA is patent without   flow-limiting stenosis or occlusion.    LEFT CAROTID SYSTEM: Noncalcified and calcified plaque produces mild   stenosis along the CCA. Scattered calcified plaque along the carotid bulb   and ICA origin.    VERTEBRAL SYSTEM: Normal in course and caliber without flow-limiting   stenosis or occlusion. Calcified plaque along the origin of the vertebral   arteries bilaterally may contribute to focal stenoses.    AORTIC ARCH: Calcified plaque along the aortic arch.    Changes from median sternotomy and CABG are partially imaged.  Enlarged main pulmonary artery segment to 3.3 cm    IMPRESSION:    CT HEAD: No acute intracranial bleeding.    Partial absence of the nasal cavity and right inferior turbinate may be   postoperative or sequelae of infectious/inflammatory etiology, such as   Wegener's or tuberculosis, or drug abuse. Correlate with patient's   medical history and surgical history.    Opacified hypoplastic sclerotic right mastoid. Correlate for mastoiditis.    CTA BRAIN: Atheromatous irregularity and mild stenoses along the carotid   siphons bilaterally.    CTA NECK: Calcified plaque along the origin of the vertebral arteries   bilaterally may contribute to focal stenoses.    Mild CCA stenosis due to noncalcified and calcified plaque.    --- End of Report ---            JOSIE CAVANAUGH MD; Attending Radiologist  This document has been electronically signed. Dec 17 2023  5:03PM    Assessment:  58F Russian-speaking w/ 3V CABG (2023), HTN, HLD, IDDM, Wegener's granulomatosis, cocaine use s/p nose surgery?, Chronic back pain w/radiculopathy presents with dizziness and presyncope, described as blacking out".    Recs:  cardiac stable  no e/o acs  denies any ischemic or heart failure sx  obtain echo  dizziness possibly in setting of hypotension and responded to IV fluids, antihypertensives currently being held  cw antiplatelet and statin  f/u neuro recs  will follow                Greater than 60 minutes spent on total encounter; more than 50% of the visit was spent counseling and/or coordinating care by the attending physician.   	  Marty Koch MD   Cardiovascular Diseases  [phone]     CHIEF COMPLAINT: dizziness    HISTORY OF PRESENT ILLNESS:  58F Russian-speaking w/ 3V CABG (2023), HTN, HLD, IDDM, Wegener's granulomatosis, cocaine use s/p nose surgery?, Chronic back pain w/radiculopathy presents with dizziness and presyncope, described as blacking out". Patient reports has passed out in the past and this felt similar but she did not lose consciousness. Dizziness described as feeling like the "room was gonna fall on her". She had similar symptoms 3 days ago that resolved on their own with rest. She also reports significant back pain for which she has had multiple shots in her back for and reports she has been taking 650mg of tylenol every 2-3 hours for her pain. pain radiates down LLE and causes significant LLE pain. Patient was admitted months ago for LLE pain and minor wound that has since healed. She has had multiple falls in the past but did not fall as a result of this dizziness. Symptoms have resolved now that BP has improved other than her back and LLE pain. Patient denies fever, chills, chest pain, SOB, abd pain, nausea, vomiting, constipation, dysuria.        Allergies    No Known Allergies    Intolerances    	    MEDICATIONS:  aspirin enteric coated 81 milliGRAM(s) Oral daily      albuterol    90 MICROgram(s) HFA Inhaler 2 Puff(s) Inhalation every 6 hours PRN  montelukast 10 milliGRAM(s) Oral at bedtime    acetaminophen     Tablet .. 650 milliGRAM(s) Oral every 6 hours PRN  gabapentin 100 milliGRAM(s) Oral three times a day  melatonin 3 milliGRAM(s) Oral at bedtime PRN  traMADol 50 milliGRAM(s) Oral three times a day PRN      atorvastatin 80 milliGRAM(s) Oral at bedtime  dextrose 50% Injectable 25 Gram(s) IV Push once  dextrose 50% Injectable 12.5 Gram(s) IV Push once  dextrose 50% Injectable 25 Gram(s) IV Push once  dextrose Oral Gel 15 Gram(s) Oral once PRN  glucagon  Injectable 1 milliGRAM(s) IntraMuscular once  insulin glargine Injectable (LANTUS) 20 Unit(s) SubCutaneous at bedtime  insulin lispro (ADMELOG) corrective regimen sliding scale   SubCutaneous three times a day before meals  insulin lispro Injectable (ADMELOG) 6 Unit(s) SubCutaneous three times a day before meals    brimonidine 0.2% Ophthalmic Solution 1 Drop(s) Both EYES daily  dextrose 5%. 1000 milliLiter(s) IV Continuous <Continuous>  dextrose 5%. 1000 milliLiter(s) IV Continuous <Continuous>  lactated ringers. 1000 milliLiter(s) IV Continuous <Continuous>      PAST MEDICAL & SURGICAL HISTORY:  Insulin dependent type 2 diabetes mellitus      Hypertension      Arthritis      CAD (coronary artery disease)      Asthma          FAMILY HISTORY:      SOCIAL HISTORY:    non smoker. indep in adl      REVIEW OF SYSTEMS:  See HPI, otherwise complete 10 point review of systems negative    [ ] All others negative	      PHYSICAL EXAM:  T(C): 36.4 (12-18-23 @ 05:10), Max: 37.1 (12-17-23 @ 13:07)  HR: 60 (12-18-23 @ 05:10) (51 - 61)  BP: 123/67 (12-18-23 @ 05:10) (75/48 - 123/67)  RR: 18 (12-18-23 @ 05:10) (16 - 18)  SpO2: 99% (12-18-23 @ 05:10) (96% - 100%)  Wt(kg): --  I&O's Summary      Appearance: No Acute Distress	  HEENT:  Normal oral mucosa, PERRL, EOMI	  Cardiovascular: Normal S1 S2, No JVD, No murmurs/rubs/gallops  Respiratory: Lungs clear to auscultation bilaterally  Gastrointestinal:  Soft, Non-tender, + BS	  Skin: No rashes, No ecchymoses, No cyanosis	  Neurologic: Non-focal  Extremities: No clubbing, cyanosis or edema  Vascular: Peripheral pulses palpable 2+ bilaterally  Psychiatry: A & O x 3, Mood & affect appropriate    Laboratory Data:	 	    CBC Full  -  ( 17 Dec 2023 14:40 )  WBC Count : 6.92 K/uL  Hemoglobin : 9.3 g/dL  Hematocrit : 28.3 %  Platelet Count - Automated : 245 K/uL  Mean Cell Volume : 93.1 fl  Mean Cell Hemoglobin : 30.6 pg  Mean Cell Hemoglobin Concentration : 32.9 gm/dL  Auto Neutrophil # : 3.09 K/uL  Auto Lymphocyte # : 3.30 K/uL  Auto Monocyte # : 0.36 K/uL  Auto Eosinophil # : 0.11 K/uL  Auto Basophil # : 0.03 K/uL  Auto Neutrophil % : 44.7 %  Auto Lymphocyte % : 47.7 %  Auto Monocyte % : 5.2 %  Auto Eosinophil % : 1.6 %  Auto Basophil % : 0.4 %    12-18    140  |  110<H>  |  30<H>  ----------------------------<  88  4.4   |  18<L>  |  0.68  12-17    137  |  105  |  41<H>  ----------------------------<  176<H>  4.3   |  22  |  0.99    Ca    9.0      18 Dec 2023 07:17  Ca    9.3      17 Dec 2023 14:40    TPro  6.6  /  Alb  4.2  /  TBili  0.2  /  DBili  x   /  AST  11  /  ALT  19  /  AlkPhos  66  12-18  TPro  6.2  /  Alb  4.0  /  TBili  0.1<L>  /  DBili  x   /  AST  12  /  ALT  18  /  AlkPhos  59  12-17      proBNP:   Lipid Profile:   HgA1c:   TSH: Thyroid Stimulating Hormone, Serum: 0.92 uIU/mL (12-17 @ 14:40)        CARDIAC MARKERS:            Interpretation of Telemetry: 	    ECG:  	  RADIOLOGY:  OTHER: 	    PREVIOUS DIAGNOSTIC TESTING:    [ ] Echocardiogram:  [ ] Catheterization:  [ ] Stress Test:  	      ACC: [id number] EXAM:  CT ANGIO BRAIN (W)AW IC   ORDERED BY: BEAR GIPSON     ACC: [id number] EXAM:  CT ANGIO NECK (W)AW IC   ORDERED BY: BEAR GIPSON     ACC: [id number] EXAM:  CT BRAIN   ORDERED BY: BEAR GIPSON     PROCEDURE DATE:  12/17/2023          INTERPRETATION:  HISTORY: Dizziness.    COMPARISON: None.    TECHNIQUE: Noncontrast CT head and CT angiogram of the neck and brain was   performed after administration of intravenous contrast. MIP and 3D   reconstructions were performed.    Total of 90 cc Omnipaque 350 intravenous contrast were administered   without complication.    FINDINGS:    CT HEAD:    There is no acute intracranial mass-effect, hemorrhage, midline shift, or   abnormal extra-axial fluid collection.    Ventricles, sulci, and cisterns are normal in size for the patient's age   without hydrocephalus. Basal cisterns are patent.    Complete opacification of a hypoplastic sclerotic right mastoid. Clear   hypoplastic left mastoid.    Scattered ethmoid sinus mucosal thickening bilaterally. Remaining   visualized paranasal sinuses and mastoid air cells are clear. Calvarium   is intact.    Partial absence of the nasal septum and partial absence of the right   inferior turbinate.    CTA BRAIN:    Suboptimal arterial opacification.    INTERNAL CAROTID ARTERIES: Atheromatous irregularity and mild stenoses   along the carotid siphons bilaterally, namely the bilateral cavernous,   right clinoid/supraclinoid segments. The ICA bifurcations are   unremarkable.    ANTERIOR CEREBRAL ARTERIES: Patent without flow-limiting stenosis or   occlusion. Anterior communicating artery is unremarkable without aneurysm.    MIDDLE CEREBRAL ARTERIES: Patent without flow-limiting stenosis or   occlusion. MCA bifurcations are unremarkable without aneurysm.    POSTERIOR CEREBRAL ARTERIES: Patent without flow-limiting stenosis or   occlusion. Fetal right PCA origin with corresponding aplastic P1 segment.   The left posterior communicating artery is not well seen.    VERTEBROBASILAR SYSTEM: Patent without flow-limiting stenosis or   occlusion.    CTA NECK:    RIGHT CAROTID SYSTEM: Noncalcified and calcified plaque produces mild   stenosis along the common carotid artery (CCA). Scattered calcified   plaque along the distal ICA segment. The ICA is patent without   flow-limiting stenosis or occlusion.    LEFT CAROTID SYSTEM: Noncalcified and calcified plaque produces mild   stenosis along the CCA. Scattered calcified plaque along the carotid bulb   and ICA origin.    VERTEBRAL SYSTEM: Normal in course and caliber without flow-limiting   stenosis or occlusion. Calcified plaque along the origin of the vertebral   arteries bilaterally may contribute to focal stenoses.    AORTIC ARCH: Calcified plaque along the aortic arch.    Changes from median sternotomy and CABG are partially imaged.  Enlarged main pulmonary artery segment to 3.3 cm    IMPRESSION:    CT HEAD: No acute intracranial bleeding.    Partial absence of the nasal cavity and right inferior turbinate may be   postoperative or sequelae of infectious/inflammatory etiology, such as   Wegener's or tuberculosis, or drug abuse. Correlate with patient's   medical history and surgical history.    Opacified hypoplastic sclerotic right mastoid. Correlate for mastoiditis.    CTA BRAIN: Atheromatous irregularity and mild stenoses along the carotid   siphons bilaterally.    CTA NECK: Calcified plaque along the origin of the vertebral arteries   bilaterally may contribute to focal stenoses.    Mild CCA stenosis due to noncalcified and calcified plaque.    --- End of Report ---            JOSIE CAVANAUGH MD; Attending Radiologist  This document has been electronically signed. Dec 17 2023  5:03PM    Assessment:  58F Russian-speaking w/ 3V CABG (2023), HTN, HLD, IDDM, Wegener's granulomatosis, cocaine use s/p nose surgery?, Chronic back pain w/radiculopathy presents with dizziness and presyncope, described as blacking out".    Recs:  cardiac stable  no e/o acs  denies any ischemic or heart failure sx  obtain echo  dizziness possibly in setting of hypotension and responded to IV fluids, antihypertensives currently being held  cw antiplatelet and statin  f/u neuro recs  will follow                Greater than 60 minutes spent on total encounter; more than 50% of the visit was spent counseling and/or coordinating care by the attending physician.   	  Marty Koch MD   Cardiovascular Diseases  [phone]

## 2023-12-18 NOTE — PHYSICAL THERAPY INITIAL EVALUATION ADULT - ADDITIONAL COMMENTS
pt lives at home with daughter, apt in a house, +12 steps to enter with HR, +1st floor living, no stairs; amb with cane at times, ind ADls and amb, +tub shower, +[facility]HA 4h/7d, +reading glasses,

## 2023-12-18 NOTE — DISCHARGE NOTE PROVIDER - NSDCCPCAREPLAN_GEN_ALL_CORE_FT
PRINCIPAL DISCHARGE DIAGNOSIS  Diagnosis: Dizziness  Assessment and Plan of Treatment: Likely due to low blood pressure and now resolved, you are recommended to have an echocardiogram and you have refused please follow up with Dr. Gomez within 2-4 days.      SECONDARY DISCHARGE DIAGNOSES  Diagnosis: Hypotension  Assessment and Plan of Treatment: You were found to have low blood pressure when you were admitted and recieved intravenous fluids with improvement. While here your Lisionpril was stopped. It was discussed with Dr. Meraz that you continue on Lisinopril 5mg this is decreased from 20mg. Please follow up with him within 2-4 days for an echocardiogram.    Diagnosis: Anemia  Assessment and Plan of Treatment: Unclear etiology of anemia, you deny signs and symptoms of bleeding. Please follow up with Dr. Gomez for repeat blood work.

## 2023-12-18 NOTE — PHYSICAL THERAPY INITIAL EVALUATION ADULT - NSPTDISCHREC_GEN_A_CORE
DC home with no skilled PT needs, assist from [facility] 4h/7d, assist from dtr as needed, owns cane, STEF Schuler aware./No skilled PT needs

## 2023-12-19 PROCEDURE — 71045 X-RAY EXAM CHEST 1 VIEW: CPT

## 2023-12-19 PROCEDURE — 85730 THROMBOPLASTIN TIME PARTIAL: CPT

## 2023-12-19 PROCEDURE — 99285 EMERGENCY DEPT VISIT HI MDM: CPT | Mod: 25

## 2023-12-19 PROCEDURE — 84443 ASSAY THYROID STIM HORMONE: CPT

## 2023-12-19 PROCEDURE — 96374 THER/PROPH/DIAG INJ IV PUSH: CPT

## 2023-12-19 PROCEDURE — 81001 URINALYSIS AUTO W/SCOPE: CPT

## 2023-12-19 PROCEDURE — 82330 ASSAY OF CALCIUM: CPT

## 2023-12-19 PROCEDURE — 36415 COLL VENOUS BLD VENIPUNCTURE: CPT

## 2023-12-19 PROCEDURE — 85027 COMPLETE CBC AUTOMATED: CPT

## 2023-12-19 PROCEDURE — 82803 BLOOD GASES ANY COMBINATION: CPT

## 2023-12-19 PROCEDURE — 83605 ASSAY OF LACTIC ACID: CPT

## 2023-12-19 PROCEDURE — 85018 HEMOGLOBIN: CPT

## 2023-12-19 PROCEDURE — 83540 ASSAY OF IRON: CPT

## 2023-12-19 PROCEDURE — 83036 HEMOGLOBIN GLYCOSYLATED A1C: CPT

## 2023-12-19 PROCEDURE — 82746 ASSAY OF FOLIC ACID SERUM: CPT

## 2023-12-19 PROCEDURE — 70496 CT ANGIOGRAPHY HEAD: CPT | Mod: MA

## 2023-12-19 PROCEDURE — 85045 AUTOMATED RETICULOCYTE COUNT: CPT

## 2023-12-19 PROCEDURE — 97166 OT EVAL MOD COMPLEX 45 MIN: CPT

## 2023-12-19 PROCEDURE — 97161 PT EVAL LOW COMPLEX 20 MIN: CPT

## 2023-12-19 PROCEDURE — 82962 GLUCOSE BLOOD TEST: CPT

## 2023-12-19 PROCEDURE — 82435 ASSAY OF BLOOD CHLORIDE: CPT

## 2023-12-19 PROCEDURE — 80053 COMPREHEN METABOLIC PANEL: CPT

## 2023-12-19 PROCEDURE — 82728 ASSAY OF FERRITIN: CPT

## 2023-12-19 PROCEDURE — 84132 ASSAY OF SERUM POTASSIUM: CPT

## 2023-12-19 PROCEDURE — 85014 HEMATOCRIT: CPT

## 2023-12-19 PROCEDURE — 80061 LIPID PANEL: CPT

## 2023-12-19 PROCEDURE — 83550 IRON BINDING TEST: CPT

## 2023-12-19 PROCEDURE — 85025 COMPLETE CBC W/AUTO DIFF WBC: CPT

## 2023-12-19 PROCEDURE — 87637 SARSCOV2&INF A&B&RSV AMP PRB: CPT

## 2023-12-19 PROCEDURE — 82607 VITAMIN B-12: CPT

## 2023-12-19 PROCEDURE — 84295 ASSAY OF SERUM SODIUM: CPT

## 2023-12-19 PROCEDURE — 87086 URINE CULTURE/COLONY COUNT: CPT

## 2023-12-19 PROCEDURE — 83010 ASSAY OF HAPTOGLOBIN QUANT: CPT

## 2023-12-19 PROCEDURE — 82947 ASSAY GLUCOSE BLOOD QUANT: CPT

## 2023-12-19 PROCEDURE — 84484 ASSAY OF TROPONIN QUANT: CPT

## 2023-12-19 PROCEDURE — 70450 CT HEAD/BRAIN W/O DYE: CPT | Mod: MA

## 2023-12-19 PROCEDURE — 93005 ELECTROCARDIOGRAM TRACING: CPT

## 2023-12-19 PROCEDURE — 85610 PROTHROMBIN TIME: CPT

## 2023-12-19 PROCEDURE — 70498 CT ANGIOGRAPHY NECK: CPT | Mod: MA

## 2023-12-19 PROCEDURE — 86140 C-REACTIVE PROTEIN: CPT

## 2023-12-19 PROCEDURE — 85652 RBC SED RATE AUTOMATED: CPT
